# Patient Record
Sex: FEMALE | Race: ASIAN | Employment: OTHER | URBAN - METROPOLITAN AREA
[De-identification: names, ages, dates, MRNs, and addresses within clinical notes are randomized per-mention and may not be internally consistent; named-entity substitution may affect disease eponyms.]

---

## 2024-06-25 RX ORDER — HYDROCODONE BITARTRATE AND ACETAMINOPHEN 5; 325 MG/1; MG/1
1 TABLET ORAL EVERY 6 HOURS PRN
COMMUNITY
Start: 2024-05-28

## 2024-06-25 RX ORDER — GLIPIZIDE 10 MG/1
10 TABLET ORAL
COMMUNITY
Start: 2024-05-14

## 2024-06-25 RX ORDER — GABAPENTIN 300 MG/1
300 CAPSULE ORAL NIGHTLY
COMMUNITY
Start: 2024-05-14

## 2024-07-05 ENCOUNTER — LAB ENCOUNTER (OUTPATIENT)
Dept: LAB | Facility: HOSPITAL | Age: 74
End: 2024-07-05
Attending: FAMILY MEDICINE
Payer: MEDICARE

## 2024-07-05 ENCOUNTER — OFFICE VISIT (OUTPATIENT)
Dept: WOUND CARE | Facility: HOSPITAL | Age: 74
End: 2024-07-05
Attending: NURSE PRACTITIONER
Payer: MEDICARE

## 2024-07-05 VITALS
HEART RATE: 87 BPM | BODY MASS INDEX: 19.99 KG/M2 | RESPIRATION RATE: 18 BRPM | SYSTOLIC BLOOD PRESSURE: 124 MMHG | OXYGEN SATURATION: 97 % | HEIGHT: 65 IN | DIASTOLIC BLOOD PRESSURE: 74 MMHG | TEMPERATURE: 98 F | WEIGHT: 120 LBS

## 2024-07-05 DIAGNOSIS — Z01.818 PREOP TESTING: ICD-10-CM

## 2024-07-05 DIAGNOSIS — Z71.89 OSTOMY NURSE CONSULTATION: Primary | ICD-10-CM

## 2024-07-05 LAB
ANTIBODY SCREEN: NEGATIVE
BASOPHILS # BLD AUTO: 0.09 X10(3) UL (ref 0–0.2)
BASOPHILS NFR BLD AUTO: 0.6 %
DEPRECATED RDW RBC AUTO: 52.3 FL (ref 35.1–46.3)
EOSINOPHIL # BLD AUTO: 4.25 X10(3) UL (ref 0–0.7)
EOSINOPHIL NFR BLD AUTO: 27.2 %
ERYTHROCYTE [DISTWIDTH] IN BLOOD BY AUTOMATED COUNT: 17 % (ref 11–15)
HCT VFR BLD AUTO: 29.7 %
HGB BLD-MCNC: 9.2 G/DL
IMM GRANULOCYTES # BLD AUTO: 0.07 X10(3) UL (ref 0–1)
IMM GRANULOCYTES NFR BLD: 0.4 %
LYMPHOCYTES # BLD AUTO: 2.16 X10(3) UL (ref 1–4)
LYMPHOCYTES NFR BLD AUTO: 13.8 %
MCH RBC QN AUTO: 26.3 PG (ref 26–34)
MCHC RBC AUTO-ENTMCNC: 31 G/DL (ref 31–37)
MCV RBC AUTO: 84.9 FL
MONOCYTES # BLD AUTO: 0.62 X10(3) UL (ref 0.1–1)
MONOCYTES NFR BLD AUTO: 4 %
NEUTROPHILS # BLD AUTO: 8.45 X10 (3) UL (ref 1.5–7.7)
NEUTROPHILS # BLD AUTO: 8.45 X10(3) UL (ref 1.5–7.7)
NEUTROPHILS NFR BLD AUTO: 54 %
PLATELET # BLD AUTO: 354 10(3)UL (ref 150–450)
RBC # BLD AUTO: 3.5 X10(6)UL
RH BLOOD TYPE: POSITIVE
WBC # BLD AUTO: 15.6 X10(3) UL (ref 4–11)

## 2024-07-05 PROCEDURE — 86900 BLOOD TYPING SEROLOGIC ABO: CPT

## 2024-07-05 PROCEDURE — 36415 COLL VENOUS BLD VENIPUNCTURE: CPT

## 2024-07-05 PROCEDURE — 85025 COMPLETE CBC W/AUTO DIFF WBC: CPT

## 2024-07-05 PROCEDURE — 86901 BLOOD TYPING SEROLOGIC RH(D): CPT

## 2024-07-05 PROCEDURE — 85060 BLOOD SMEAR INTERPRETATION: CPT

## 2024-07-05 PROCEDURE — 86850 RBC ANTIBODY SCREEN: CPT

## 2024-07-05 PROCEDURE — 99213 OFFICE O/P EST LOW 20 MIN: CPT

## 2024-07-05 PROCEDURE — 86920 COMPATIBILITY TEST SPIN: CPT

## 2024-07-05 NOTE — PROGRESS NOTES
Patient seen today for ostomy marking. Plan for XI robotic assisted laparoscopic radical cystectomy, ileal conduit urinary diversion, bilateral pelvic lymph node dissection  surgery with Dr. Ibarra on 7/8/2024. Patient assessed in laying, sitting, and standing positions for optimal marking site. Stoma marking made in right lower within the patient's line of sight. Marking was made within rectus muscle avoiding belt line, away from umbilicus and creases/folds. Patient visualized stoma marking. Marking made with black pen and secured with Tegaderm. Extra Tegaderm given to patient. Discussed general ostomy care and expectations for post-op inpatient ostomy teaching; all questions answered to best of my ability.

## 2024-07-07 ENCOUNTER — ANESTHESIA EVENT (OUTPATIENT)
Dept: SURGERY | Facility: HOSPITAL | Age: 74
End: 2024-07-07
Payer: MEDICARE

## 2024-07-08 ENCOUNTER — HOSPITAL ENCOUNTER (INPATIENT)
Facility: HOSPITAL | Age: 74
LOS: 4 days | Discharge: HOME HEALTH | End: 2024-07-12
Attending: UROLOGY | Admitting: UROLOGY
Payer: MEDICARE

## 2024-07-08 ENCOUNTER — ANESTHESIA (OUTPATIENT)
Dept: SURGERY | Facility: HOSPITAL | Age: 74
End: 2024-07-08
Payer: MEDICARE

## 2024-07-08 ENCOUNTER — HOSPITAL ENCOUNTER (INPATIENT)
Facility: HOSPITAL | Age: 74
End: 2024-07-08
Attending: UROLOGY | Admitting: UROLOGY
Payer: MEDICARE

## 2024-07-08 DIAGNOSIS — C67.3 MALIGNANT NEOPLASM OF ANTERIOR WALL OF URINARY BLADDER (HCC): ICD-10-CM

## 2024-07-08 DIAGNOSIS — Z01.818 PREOP TESTING: Primary | ICD-10-CM

## 2024-07-08 PROBLEM — C67.9 BLADDER CANCER (HCC): Status: ACTIVE | Noted: 2024-07-08

## 2024-07-08 LAB
ANION GAP SERPL CALC-SCNC: 6 MMOL/L (ref 0–18)
BUN BLD-MCNC: 9 MG/DL (ref 9–23)
BUN/CREAT SERPL: 7.3 (ref 10–20)
CALCIUM BLD-MCNC: 8.6 MG/DL (ref 8.7–10.4)
CHLORIDE SERPL-SCNC: 109 MMOL/L (ref 98–112)
CO2 SERPL-SCNC: 18 MMOL/L (ref 21–32)
CREAT BLD-MCNC: 1.23 MG/DL
DEPRECATED RDW RBC AUTO: 52.3 FL (ref 35.1–46.3)
EGFRCR SERPLBLD CKD-EPI 2021: 46 ML/MIN/1.73M2 (ref 60–?)
ERYTHROCYTE [DISTWIDTH] IN BLOOD BY AUTOMATED COUNT: 16.8 % (ref 11–15)
GLUCOSE BLD-MCNC: 275 MG/DL (ref 70–99)
GLUCOSE BLDC GLUCOMTR-MCNC: 155 MG/DL (ref 70–99)
GLUCOSE BLDC GLUCOMTR-MCNC: 270 MG/DL (ref 70–99)
GLUCOSE BLDC GLUCOMTR-MCNC: 274 MG/DL (ref 70–99)
GLUCOSE BLDC GLUCOMTR-MCNC: 295 MG/DL (ref 70–99)
HCT VFR BLD AUTO: 28.2 %
HGB BLD-MCNC: 9.2 G/DL
MCH RBC QN AUTO: 27.5 PG (ref 26–34)
MCHC RBC AUTO-ENTMCNC: 32.6 G/DL (ref 31–37)
MCV RBC AUTO: 84.2 FL
OSMOLALITY SERPL CALC.SUM OF ELEC: 284 MOSM/KG (ref 275–295)
PLATELET # BLD AUTO: 284 10(3)UL (ref 150–450)
POTASSIUM SERPL-SCNC: 5.5 MMOL/L (ref 3.5–5.1)
RBC # BLD AUTO: 3.35 X10(6)UL
RH BLOOD TYPE: POSITIVE
SODIUM SERPL-SCNC: 133 MMOL/L (ref 136–145)
WBC # BLD AUTO: 11.8 X10(3) UL (ref 4–11)

## 2024-07-08 PROCEDURE — 0UT94ZZ RESECTION OF UTERUS, PERCUTANEOUS ENDOSCOPIC APPROACH: ICD-10-PCS | Performed by: UROLOGY

## 2024-07-08 PROCEDURE — 0TTB4ZZ RESECTION OF BLADDER, PERCUTANEOUS ENDOSCOPIC APPROACH: ICD-10-PCS | Performed by: UROLOGY

## 2024-07-08 PROCEDURE — 82962 GLUCOSE BLOOD TEST: CPT

## 2024-07-08 PROCEDURE — 0UT24ZZ RESECTION OF BILATERAL OVARIES, PERCUTANEOUS ENDOSCOPIC APPROACH: ICD-10-PCS | Performed by: UROLOGY

## 2024-07-08 PROCEDURE — 88342 IMHCHEM/IMCYTCHM 1ST ANTB: CPT | Performed by: UROLOGY

## 2024-07-08 PROCEDURE — 88364 INSITU HYBRIDIZATION (FISH): CPT | Performed by: UROLOGY

## 2024-07-08 PROCEDURE — 88341 IMHCHEM/IMCYTCHM EA ADD ANTB: CPT | Performed by: UROLOGY

## 2024-07-08 PROCEDURE — 85027 COMPLETE CBC AUTOMATED: CPT | Performed by: UROLOGY

## 2024-07-08 PROCEDURE — 88365 INSITU HYBRIDIZATION (FISH): CPT | Performed by: UROLOGY

## 2024-07-08 PROCEDURE — 0T184ZC BYPASS BILATERAL URETERS TO ILEOCUTANEOUS, PERCUTANEOUS ENDOSCOPIC APPROACH: ICD-10-PCS | Performed by: UROLOGY

## 2024-07-08 PROCEDURE — 0UT74ZZ RESECTION OF BILATERAL FALLOPIAN TUBES, PERCUTANEOUS ENDOSCOPIC APPROACH: ICD-10-PCS | Performed by: UROLOGY

## 2024-07-08 PROCEDURE — 88309 TISSUE EXAM BY PATHOLOGIST: CPT | Performed by: UROLOGY

## 2024-07-08 PROCEDURE — 88307 TISSUE EXAM BY PATHOLOGIST: CPT | Performed by: UROLOGY

## 2024-07-08 PROCEDURE — 80048 BASIC METABOLIC PNL TOTAL CA: CPT | Performed by: UROLOGY

## 2024-07-08 PROCEDURE — 07BC4ZZ EXCISION OF PELVIS LYMPHATIC, PERCUTANEOUS ENDOSCOPIC APPROACH: ICD-10-PCS | Performed by: UROLOGY

## 2024-07-08 PROCEDURE — 88305 TISSUE EXAM BY PATHOLOGIST: CPT | Performed by: UROLOGY

## 2024-07-08 PROCEDURE — 8E0W4CZ ROBOTIC ASSISTED PROCEDURE OF TRUNK REGION, PERCUTANEOUS ENDOSCOPIC APPROACH: ICD-10-PCS | Performed by: UROLOGY

## 2024-07-08 RX ORDER — DEXTROSE MONOHYDRATE 25 G/50ML
50 INJECTION, SOLUTION INTRAVENOUS
Status: DISCONTINUED | OUTPATIENT
Start: 2024-07-08 | End: 2024-07-08 | Stop reason: HOSPADM

## 2024-07-08 RX ORDER — ONDANSETRON 2 MG/ML
4 INJECTION INTRAMUSCULAR; INTRAVENOUS EVERY 6 HOURS PRN
Status: DISCONTINUED | OUTPATIENT
Start: 2024-07-08 | End: 2024-07-12

## 2024-07-08 RX ORDER — HEPARIN SODIUM 5000 [USP'U]/ML
5000 INJECTION, SOLUTION INTRAVENOUS; SUBCUTANEOUS ONCE
Status: COMPLETED | OUTPATIENT
Start: 2024-07-08 | End: 2024-07-08

## 2024-07-08 RX ORDER — PROCHLORPERAZINE EDISYLATE 5 MG/ML
5 INJECTION INTRAMUSCULAR; INTRAVENOUS ONCE
Status: COMPLETED | OUTPATIENT
Start: 2024-07-08 | End: 2024-07-08

## 2024-07-08 RX ORDER — GLYCOPYRROLATE 0.2 MG/ML
INJECTION, SOLUTION INTRAMUSCULAR; INTRAVENOUS AS NEEDED
Status: DISCONTINUED | OUTPATIENT
Start: 2024-07-08 | End: 2024-07-08 | Stop reason: SURG

## 2024-07-08 RX ORDER — SODIUM CHLORIDE, SODIUM LACTATE, POTASSIUM CHLORIDE, CALCIUM CHLORIDE 600; 310; 30; 20 MG/100ML; MG/100ML; MG/100ML; MG/100ML
INJECTION, SOLUTION INTRAVENOUS CONTINUOUS
Status: DISCONTINUED | OUTPATIENT
Start: 2024-07-08 | End: 2024-07-08 | Stop reason: ALTCHOICE

## 2024-07-08 RX ORDER — SODIUM CHLORIDE 9 MG/ML
INJECTION, SOLUTION INTRAVENOUS CONTINUOUS
Status: DISCONTINUED | OUTPATIENT
Start: 2024-07-08 | End: 2024-07-10

## 2024-07-08 RX ORDER — MORPHINE SULFATE 10 MG/ML
6 INJECTION, SOLUTION INTRAMUSCULAR; INTRAVENOUS EVERY 10 MIN PRN
Status: DISCONTINUED | OUTPATIENT
Start: 2024-07-08 | End: 2024-07-08 | Stop reason: HOSPADM

## 2024-07-08 RX ORDER — NEOSTIGMINE METHYLSULFATE 1 MG/ML
INJECTION, SOLUTION INTRAVENOUS AS NEEDED
Status: DISCONTINUED | OUTPATIENT
Start: 2024-07-08 | End: 2024-07-08 | Stop reason: SURG

## 2024-07-08 RX ORDER — HYDROMORPHONE HYDROCHLORIDE 1 MG/ML
0.2 INJECTION, SOLUTION INTRAMUSCULAR; INTRAVENOUS; SUBCUTANEOUS EVERY 2 HOUR PRN
Status: DISCONTINUED | OUTPATIENT
Start: 2024-07-08 | End: 2024-07-12

## 2024-07-08 RX ORDER — DOCUSATE SODIUM 100 MG/1
100 CAPSULE, LIQUID FILLED ORAL 2 TIMES DAILY
Status: DISCONTINUED | OUTPATIENT
Start: 2024-07-08 | End: 2024-07-12

## 2024-07-08 RX ORDER — ENOXAPARIN SODIUM 100 MG/ML
40 INJECTION SUBCUTANEOUS NIGHTLY
Status: DISCONTINUED | OUTPATIENT
Start: 2024-07-08 | End: 2024-07-12

## 2024-07-08 RX ORDER — ACETAMINOPHEN 500 MG
1000 TABLET ORAL EVERY 6 HOURS
Status: DISCONTINUED | OUTPATIENT
Start: 2024-07-08 | End: 2024-07-12

## 2024-07-08 RX ORDER — MORPHINE SULFATE 4 MG/ML
4 INJECTION, SOLUTION INTRAMUSCULAR; INTRAVENOUS EVERY 10 MIN PRN
Status: DISCONTINUED | OUTPATIENT
Start: 2024-07-08 | End: 2024-07-08 | Stop reason: HOSPADM

## 2024-07-08 RX ORDER — BUPIVACAINE HYDROCHLORIDE 2.5 MG/ML
INJECTION, SOLUTION EPIDURAL; INFILTRATION; INTRACAUDAL AS NEEDED
Status: DISCONTINUED | OUTPATIENT
Start: 2024-07-08 | End: 2024-07-08 | Stop reason: HOSPADM

## 2024-07-08 RX ORDER — MIDAZOLAM HYDROCHLORIDE 1 MG/ML
INJECTION INTRAMUSCULAR; INTRAVENOUS AS NEEDED
Status: DISCONTINUED | OUTPATIENT
Start: 2024-07-08 | End: 2024-07-08 | Stop reason: SURG

## 2024-07-08 RX ORDER — ONDANSETRON 2 MG/ML
4 INJECTION INTRAMUSCULAR; INTRAVENOUS ONCE
Status: COMPLETED | OUTPATIENT
Start: 2024-07-08 | End: 2024-07-08

## 2024-07-08 RX ORDER — FAMOTIDINE 20 MG/1
20 TABLET, FILM COATED ORAL NIGHTLY
Status: DISCONTINUED | OUTPATIENT
Start: 2024-07-08 | End: 2024-07-12

## 2024-07-08 RX ORDER — IBUPROFEN 400 MG/1
400 TABLET ORAL EVERY 6 HOURS
Status: DISCONTINUED | OUTPATIENT
Start: 2024-07-08 | End: 2024-07-12

## 2024-07-08 RX ORDER — HYDROMORPHONE HYDROCHLORIDE 1 MG/ML
0.4 INJECTION, SOLUTION INTRAMUSCULAR; INTRAVENOUS; SUBCUTANEOUS EVERY 2 HOUR PRN
Status: DISCONTINUED | OUTPATIENT
Start: 2024-07-08 | End: 2024-07-12

## 2024-07-08 RX ORDER — LIDOCAINE HYDROCHLORIDE ANHYDROUS AND DEXTROSE MONOHYDRATE .8; 5 G/100ML; G/100ML
INJECTION, SOLUTION INTRAVENOUS CONTINUOUS PRN
Status: DISCONTINUED | OUTPATIENT
Start: 2024-07-08 | End: 2024-07-08 | Stop reason: SURG

## 2024-07-08 RX ORDER — FAMOTIDINE 10 MG/ML
20 INJECTION, SOLUTION INTRAVENOUS NIGHTLY
Status: DISCONTINUED | OUTPATIENT
Start: 2024-07-08 | End: 2024-07-12

## 2024-07-08 RX ORDER — KETAMINE HYDROCHLORIDE 50 MG/ML
INJECTION, SOLUTION INTRAMUSCULAR; INTRAVENOUS AS NEEDED
Status: DISCONTINUED | OUTPATIENT
Start: 2024-07-08 | End: 2024-07-08 | Stop reason: SURG

## 2024-07-08 RX ORDER — BISACODYL 10 MG
10 SUPPOSITORY, RECTAL RECTAL
Status: DISCONTINUED | OUTPATIENT
Start: 2024-07-08 | End: 2024-07-12

## 2024-07-08 RX ORDER — SENNOSIDES 8.6 MG
17.2 TABLET ORAL NIGHTLY
Status: DISCONTINUED | OUTPATIENT
Start: 2024-07-08 | End: 2024-07-12

## 2024-07-08 RX ORDER — SODIUM CHLORIDE 9 MG/ML
INJECTION, SOLUTION INTRAVENOUS CONTINUOUS PRN
Status: DISCONTINUED | OUTPATIENT
Start: 2024-07-08 | End: 2024-07-08 | Stop reason: SURG

## 2024-07-08 RX ORDER — SODIUM CHLORIDE, SODIUM LACTATE, POTASSIUM CHLORIDE, CALCIUM CHLORIDE 600; 310; 30; 20 MG/100ML; MG/100ML; MG/100ML; MG/100ML
INJECTION, SOLUTION INTRAVENOUS CONTINUOUS
Status: DISCONTINUED | OUTPATIENT
Start: 2024-07-08 | End: 2024-07-08 | Stop reason: HOSPADM

## 2024-07-08 RX ORDER — METRONIDAZOLE 500 MG/100ML
500 INJECTION, SOLUTION INTRAVENOUS ONCE
Status: COMPLETED | OUTPATIENT
Start: 2024-07-08 | End: 2024-07-08

## 2024-07-08 RX ORDER — HYDROMORPHONE HYDROCHLORIDE 1 MG/ML
0.6 INJECTION, SOLUTION INTRAMUSCULAR; INTRAVENOUS; SUBCUTANEOUS EVERY 5 MIN PRN
Status: DISCONTINUED | OUTPATIENT
Start: 2024-07-08 | End: 2024-07-08 | Stop reason: HOSPADM

## 2024-07-08 RX ORDER — ROCURONIUM BROMIDE 10 MG/ML
INJECTION, SOLUTION INTRAVENOUS AS NEEDED
Status: DISCONTINUED | OUTPATIENT
Start: 2024-07-08 | End: 2024-07-08 | Stop reason: SURG

## 2024-07-08 RX ORDER — TRAMADOL HYDROCHLORIDE 50 MG/1
50 TABLET ORAL EVERY 6 HOURS PRN
Status: DISCONTINUED | OUTPATIENT
Start: 2024-07-08 | End: 2024-07-12

## 2024-07-08 RX ORDER — HYDROMORPHONE HYDROCHLORIDE 1 MG/ML
0.2 INJECTION, SOLUTION INTRAMUSCULAR; INTRAVENOUS; SUBCUTANEOUS EVERY 5 MIN PRN
Status: DISCONTINUED | OUTPATIENT
Start: 2024-07-08 | End: 2024-07-08 | Stop reason: HOSPADM

## 2024-07-08 RX ORDER — ONDANSETRON 2 MG/ML
INJECTION INTRAMUSCULAR; INTRAVENOUS AS NEEDED
Status: DISCONTINUED | OUTPATIENT
Start: 2024-07-08 | End: 2024-07-08 | Stop reason: SURG

## 2024-07-08 RX ORDER — NICOTINE POLACRILEX 4 MG
15 LOZENGE BUCCAL
Status: DISCONTINUED | OUTPATIENT
Start: 2024-07-08 | End: 2024-07-08 | Stop reason: HOSPADM

## 2024-07-08 RX ORDER — NALOXONE HYDROCHLORIDE 0.4 MG/ML
80 INJECTION, SOLUTION INTRAMUSCULAR; INTRAVENOUS; SUBCUTANEOUS AS NEEDED
Status: DISCONTINUED | OUTPATIENT
Start: 2024-07-08 | End: 2024-07-08 | Stop reason: HOSPADM

## 2024-07-08 RX ORDER — GABAPENTIN 300 MG/1
300 CAPSULE ORAL NIGHTLY
Status: DISCONTINUED | OUTPATIENT
Start: 2024-07-08 | End: 2024-07-12

## 2024-07-08 RX ORDER — INSULIN ASPART 100 [IU]/ML
5 INJECTION, SOLUTION INTRAVENOUS; SUBCUTANEOUS ONCE
Status: COMPLETED | OUTPATIENT
Start: 2024-07-08 | End: 2024-07-08

## 2024-07-08 RX ORDER — MORPHINE SULFATE 4 MG/ML
2 INJECTION, SOLUTION INTRAMUSCULAR; INTRAVENOUS EVERY 10 MIN PRN
Status: DISCONTINUED | OUTPATIENT
Start: 2024-07-08 | End: 2024-07-08 | Stop reason: HOSPADM

## 2024-07-08 RX ORDER — NICOTINE POLACRILEX 4 MG
30 LOZENGE BUCCAL
Status: DISCONTINUED | OUTPATIENT
Start: 2024-07-08 | End: 2024-07-08 | Stop reason: HOSPADM

## 2024-07-08 RX ORDER — ACETAMINOPHEN 500 MG
1000 TABLET ORAL ONCE
Status: DISCONTINUED | OUTPATIENT
Start: 2024-07-08 | End: 2024-07-08 | Stop reason: HOSPADM

## 2024-07-08 RX ORDER — DEXAMETHASONE SODIUM PHOSPHATE 4 MG/ML
VIAL (ML) INJECTION AS NEEDED
Status: DISCONTINUED | OUTPATIENT
Start: 2024-07-08 | End: 2024-07-08 | Stop reason: SURG

## 2024-07-08 RX ORDER — HYDRALAZINE HYDROCHLORIDE 20 MG/ML
5 INJECTION INTRAMUSCULAR; INTRAVENOUS AS NEEDED
Status: DISCONTINUED | OUTPATIENT
Start: 2024-07-08 | End: 2024-07-08 | Stop reason: HOSPADM

## 2024-07-08 RX ORDER — METRONIDAZOLE 500 MG/100ML
500 INJECTION, SOLUTION INTRAVENOUS ONCE
Status: DISCONTINUED | OUTPATIENT
Start: 2024-07-08 | End: 2024-07-08

## 2024-07-08 RX ORDER — METOCLOPRAMIDE HYDROCHLORIDE 5 MG/ML
5 INJECTION INTRAMUSCULAR; INTRAVENOUS EVERY 8 HOURS PRN
Status: DISCONTINUED | OUTPATIENT
Start: 2024-07-08 | End: 2024-07-12

## 2024-07-08 RX ORDER — HYDROMORPHONE HYDROCHLORIDE 1 MG/ML
0.4 INJECTION, SOLUTION INTRAMUSCULAR; INTRAVENOUS; SUBCUTANEOUS EVERY 5 MIN PRN
Status: DISCONTINUED | OUTPATIENT
Start: 2024-07-08 | End: 2024-07-08 | Stop reason: HOSPADM

## 2024-07-08 RX ORDER — LIDOCAINE HYDROCHLORIDE 10 MG/ML
INJECTION, SOLUTION EPIDURAL; INFILTRATION; INTRACAUDAL; PERINEURAL AS NEEDED
Status: DISCONTINUED | OUTPATIENT
Start: 2024-07-08 | End: 2024-07-08 | Stop reason: SURG

## 2024-07-08 RX ADMIN — METRONIDAZOLE 500 MG: 500 INJECTION, SOLUTION INTRAVENOUS at 08:12:00

## 2024-07-08 RX ADMIN — SODIUM CHLORIDE, SODIUM LACTATE, POTASSIUM CHLORIDE, CALCIUM CHLORIDE: 600; 310; 30; 20 INJECTION, SOLUTION INTRAVENOUS at 09:46:00

## 2024-07-08 RX ADMIN — SODIUM CHLORIDE, SODIUM LACTATE, POTASSIUM CHLORIDE, CALCIUM CHLORIDE: 600; 310; 30; 20 INJECTION, SOLUTION INTRAVENOUS at 08:21:00

## 2024-07-08 RX ADMIN — LIDOCAINE HYDROCHLORIDE 50 MG: 10 INJECTION, SOLUTION EPIDURAL; INFILTRATION; INTRACAUDAL; PERINEURAL at 07:49:00

## 2024-07-08 RX ADMIN — SODIUM CHLORIDE, SODIUM LACTATE, POTASSIUM CHLORIDE, CALCIUM CHLORIDE: 600; 310; 30; 20 INJECTION, SOLUTION INTRAVENOUS at 11:51:00

## 2024-07-08 RX ADMIN — SODIUM CHLORIDE: 9 INJECTION, SOLUTION INTRAVENOUS at 12:02:00

## 2024-07-08 RX ADMIN — LIDOCAINE HYDROCHLORIDE ANHYDROUS AND DEXTROSE MONOHYDRATE 2 MG/MIN: .8; 5 INJECTION, SOLUTION INTRAVENOUS at 08:26:00

## 2024-07-08 RX ADMIN — MIDAZOLAM HYDROCHLORIDE 1 MG: 1 INJECTION INTRAMUSCULAR; INTRAVENOUS at 07:47:00

## 2024-07-08 RX ADMIN — SODIUM CHLORIDE: 9 INJECTION, SOLUTION INTRAVENOUS at 08:01:00

## 2024-07-08 RX ADMIN — SODIUM CHLORIDE, SODIUM LACTATE, POTASSIUM CHLORIDE, CALCIUM CHLORIDE: 600; 310; 30; 20 INJECTION, SOLUTION INTRAVENOUS at 07:45:00

## 2024-07-08 RX ADMIN — ROCURONIUM BROMIDE 20 MG: 10 INJECTION, SOLUTION INTRAVENOUS at 08:39:00

## 2024-07-08 RX ADMIN — ROCURONIUM BROMIDE 20 MG: 10 INJECTION, SOLUTION INTRAVENOUS at 08:02:00

## 2024-07-08 RX ADMIN — LIDOCAINE HYDROCHLORIDE ANHYDROUS AND DEXTROSE MONOHYDRATE 2 MG/MIN: .8; 5 INJECTION, SOLUTION INTRAVENOUS at 12:16:00

## 2024-07-08 RX ADMIN — SODIUM CHLORIDE: 9 INJECTION, SOLUTION INTRAVENOUS at 13:10:00

## 2024-07-08 RX ADMIN — ROCURONIUM BROMIDE 10 MG: 10 INJECTION, SOLUTION INTRAVENOUS at 07:49:00

## 2024-07-08 RX ADMIN — SODIUM CHLORIDE: 9 INJECTION, SOLUTION INTRAVENOUS at 11:52:00

## 2024-07-08 RX ADMIN — MIDAZOLAM HYDROCHLORIDE 1 MG: 1 INJECTION INTRAMUSCULAR; INTRAVENOUS at 07:57:00

## 2024-07-08 RX ADMIN — ROCURONIUM BROMIDE 20 MG: 10 INJECTION, SOLUTION INTRAVENOUS at 11:47:00

## 2024-07-08 RX ADMIN — LIDOCAINE HYDROCHLORIDE ANHYDROUS AND DEXTROSE MONOHYDRATE 1 MG/MIN: .8; 5 INJECTION, SOLUTION INTRAVENOUS at 08:03:00

## 2024-07-08 RX ADMIN — GLYCOPYRROLATE 0.6 MG: 0.2 INJECTION, SOLUTION INTRAMUSCULAR; INTRAVENOUS at 13:26:00

## 2024-07-08 RX ADMIN — KETAMINE HYDROCHLORIDE 25 MG: 50 INJECTION, SOLUTION INTRAMUSCULAR; INTRAVENOUS at 08:33:00

## 2024-07-08 RX ADMIN — ONDANSETRON 4 MG: 2 INJECTION INTRAMUSCULAR; INTRAVENOUS at 13:12:00

## 2024-07-08 RX ADMIN — SODIUM CHLORIDE: 9 INJECTION, SOLUTION INTRAVENOUS at 09:46:00

## 2024-07-08 RX ADMIN — NEOSTIGMINE METHYLSULFATE 3 MG: 1 INJECTION, SOLUTION INTRAVENOUS at 13:26:00

## 2024-07-08 RX ADMIN — DEXAMETHASONE SODIUM PHOSPHATE 8 MG: 4 MG/ML VIAL (ML) INJECTION at 08:21:00

## 2024-07-08 RX ADMIN — SODIUM CHLORIDE, SODIUM LACTATE, POTASSIUM CHLORIDE, CALCIUM CHLORIDE: 600; 310; 30; 20 INJECTION, SOLUTION INTRAVENOUS at 12:31:00

## 2024-07-08 RX ADMIN — KETAMINE HYDROCHLORIDE 25 MG: 50 INJECTION, SOLUTION INTRAMUSCULAR; INTRAVENOUS at 09:46:00

## 2024-07-08 RX ADMIN — LIDOCAINE HYDROCHLORIDE ANHYDROUS AND DEXTROSE MONOHYDRATE 2 MG/MIN: .8; 5 INJECTION, SOLUTION INTRAVENOUS at 12:06:00

## 2024-07-08 RX ADMIN — ROCURONIUM BROMIDE 20 MG: 10 INJECTION, SOLUTION INTRAVENOUS at 08:46:00

## 2024-07-08 RX ADMIN — SODIUM CHLORIDE: 9 INJECTION, SOLUTION INTRAVENOUS at 08:20:00

## 2024-07-08 RX ADMIN — LIDOCAINE HYDROCHLORIDE ANHYDROUS AND DEXTROSE MONOHYDRATE 1 MG/MIN: .8; 5 INJECTION, SOLUTION INTRAVENOUS at 12:11:00

## 2024-07-08 RX ADMIN — LIDOCAINE HYDROCHLORIDE ANHYDROUS AND DEXTROSE MONOHYDRATE 3 MG/MIN: .8; 5 INJECTION, SOLUTION INTRAVENOUS at 09:18:00

## 2024-07-08 NOTE — ANESTHESIA PROCEDURE NOTES
Airway  Date/Time: 7/8/2024 7:57 AM  Airway not difficult    General Information and Staff    Patient location during procedure: OR  Resident/CRNA: Lelo Hays CRNA  Performed: CRNA   Performed by: Lelo Hays CRNA  Authorized by: Laverne Hernandez MD      Indications and Patient Condition  Indications for airway management: anesthesia  Spontaneous ventilation: present  Sedation level: deep  Preoxygenated: yes  Patient position: sniffing  MILS maintained throughout  Mask difficulty assessment: 0 - not attempted  No planned trial extubation    Final Airway Details  Final airway type: endotracheal airway      Successful airway: ETT  Cuffed: yes   Successful intubation technique: Video laryngoscopy  Endotracheal tube insertion site: oral  Blade: Cody  Blade size: #3  ETT size (mm): 7.5    Cormack-Lehane Classification: grade I - full view of glottis  Placement verified by: capnometry   Cuff volume (mL): 6  Measured from: lips  ETT to lips (cm): 22  Number of attempts at approach: 1

## 2024-07-08 NOTE — ANESTHESIA POSTPROCEDURE EVALUATION
Patient: Michael Ibarra    Procedure Summary       Date: 07/08/24 Room / Location: [facility] MAIN OR  / [facility] MAIN OR    Anesthesia Start: 0745 Anesthesia Stop:     Procedure: XI robotic assisted laparoscopic radical cystectomy, ileal conduit urinary diversion, bilateral pelvic lymph node dissection (Abdomen) Diagnosis: (Bladder cancer)    Surgeons: Lenin Ibarra MD Anesthesiologist: Laverne Hernandez MD    Anesthesia Type: general ASA Status: 2            Anesthesia Type: general    Vitals Value Taken Time   /101 07/08/24 1352   Temp 98.1 07/08/24 1353   Pulse 77 07/08/24 1353   Resp 23 07/08/24 1353   SpO2 98 % 07/08/24 1353   Vitals shown include unfiled device data.    EM AN Post Evaluation:   Patient Evaluated in PACU  Patient Participation: complete - patient participated  Level of Consciousness: awake and alert  Pain Score: 0  Pain Management: adequate  Airway Patency:patent  Dental exam unchanged from preop  Yes    Nausea/Vomiting: none  Cardiovascular Status: acceptable  Respiratory Status: acceptable  Postoperative Hydration acceptable      SAMIR DEWITT CRNA  7/8/2024 1:53 PM

## 2024-07-08 NOTE — OPERATIVE REPORT
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  We reviewed management with the patient and she elected to proceed with radical cystectomy ileal conduit urinary diversion.  Anesthesia: general  Complications: none  Specimen: bladder/uterus/bilateral tubes/ovaries, bilateral pelvic lymph nodes,   Drains: ureteral stents, TOM  Condition: stable  Estimated Blood Loss: 100  Operative Details: see dictated note  Disposition: to JUSTICE    Operative note: Patient was identified and taken to the OR. After induction of anesthesia and placement of airway, patient was repositioned into supine and prepped and draped in sterile fashion.  We placed a 16Fr do catheter into the bladder.  We made a 1cm incision above the umbilicus and placed a Veress needle into the abdomen.  We insufflated the abdomen to 15mmgHg and placed a 8mm trocar at this site. We inspected the abdomen and then placed 3, 8-mm robotic trocars, 12 mm trocar and 5mm trocar all under direct vision and with local anesthetic.  the pressure was lowered to 8mmHG.  The patient was placed into steep trendelenburg and we docked the robot to the patient.    We took down colonic and small bowel adhesions from the pelvis to expose the pelvis and a posterior peritoneal incision was made to mobilize the ovaries and uterus.  The round ligament and gonadal vein were ligated bilaterally.    We then then performed a right sided lymph node dissection.  The  borders of the dissection were the external and common iliac artery deep down to the obturator nerve and vessels.  Medially we dissected down to the internal illiac artery.  Weck clips were used to ligate the lymphatics and we placed the external iliac chain separately from the common iliac chain.  There were no enlarged lymph nodes or lymph node masses.  The packets were collected in endocatch bags and sent for permanent pathology.  This was repeated on the left side.  During each side of dissection we identified the right and left ureter and these were mobilized down the bladder.  A Weck clip was placed on the distal ureter at the level of the bladder and the ureters were transected.  A 4-0 vicryl stay was placed on each ureter.       The posterior and superior vascular pedicle to the bladder was ligated on both sides with a vessel sealer and weck clips.  This dissected was kept wide at the posterior margin.   The endopelvic fascia was opened bilaterally.  The vagina was opened at the apex and dissected laterally towards the urethra.    Hemostasis was very good at this time of the case.  We then mobilized the bladder off of the anterior abdominal wall.  The space of retzius was entered and the prostate was fully mobilized.  We placed an 0-vicryl suture to ligate the deep dorsal vein.  Following this, we transected the deep dorsal vein and the urethra.  We placed a clip on the do and transected the catheter.  The remainder of the specimen was mobilized and then collected into an endocatch bag.  We extracted the lymph nodes and specimens through vagina.  We irrigated the pelvis and placed a mini-lap into the pelvis.  Hemostasis was excellent.  We then sutured the vagina  closed with a 3-0 vlock suture in running fashion.         Attention was then turned to mobilizing the left ureter more proximally and then bringing it over the right pelvis under the sigmoid mesentery.  Following this, we identified the terminal  ileum and cecum.  The small bowel mesentery was adhesed and required some lysis of adhesions.  We moblized a segment of ileum appoximately 20cm from the TI to use as our conduit.  Stay sutures of 3-0 silk and 2-0 vicryl were used to louise out the distal and proximal conduit.  We used the robotic stapler, 45mm to harvest the proximal and distal extent of the conduit.  The small bowel anastomosis was then completed.  We bought together the small bowel in a side to side configuration and placed stay sutures.  Two small enterotomies were made to place the stapler through and the anastomosis was complete using 2 45mm loads. We then used another load to close the anastomosis/enterotomy line.  We then sutured the back of the staple line for reinforcement.   We then brought th proximal end of the conduit into the pelvis.  A proximal and distal opening was made and the 6.5 fr feeding tubes were brought down to the level of the left ureter.  We then Spatulated the ureter and completed the uretero-intestinal anastomosis using 4-0 vicryl in running fashion with 2 sutures.  A similar anastomosis was completed for the right side.  We then sutured the stents to the conduit using 4-0 vicryl suture.        We placed a pelvic drain reaching the deep pelvis and up to the ureterointestinal anastomosis. It was secured with a 2-0 nylon suture    Following this a stay suture was placed into the conduit for extraction to the stoma level. The robot was undocked from the patient.  The conduit easily reached the stoma site.  A circumferential incision around a robotic trocar site was made and the fascia opened slightly to allow the conduit through.  We delivered the conduit to the skin and matured to the stoma using 3-0 vicryl suture to make a everted stoma site in interrupted fashion.  The stents were resutured to the conduit.  The patient's would was irrigated and closed with 0-PDS in running fashion using multiple sutures.  The skin was  irrigated with antibiotic irrigation and closed with 4-0 monocryl in subcuticular fashion.  Dressing were applied and he was awoken from anesthesia and taken to pacu in stable condition.  She tolerated the procedure well.      Lenin Ibarra

## 2024-07-08 NOTE — H&P
Reviewed from Dr. Severson, 6-28-24 and Nadja Ibarra 6-26-24    The above referenced H&P was reviewed by Lenin Ibarra MD on 7/8/2024, the patient was examined and no significant changes have occurred in the patient's condition since the H&P was performed.  Risks and benefits were discussed, proceed with procedure as planned.

## 2024-07-08 NOTE — ANESTHESIA PREPROCEDURE EVALUATION
Anesthesia PreOp Note    HPI:     Michael Ibarra is a 74 year old female who presents for preoperative consultation requested by: Lenin Ibarra MD    Date of Surgery: 7/8/2024    Procedure(s):  XI robotic assisted laparoscopic radical cystectomy, ileal conduit urinary diversion, bilateral pelvic lymph node dissection  Indication: Bladder cancer    Relevant Problems   No relevant active problems       NPO:  Last Liquid Consumption Date: 07/08/24  Last Liquid Consumption Time: 0200  Last Solid Consumption Date: 07/07/24  Last Solid Consumption Time: 1130  Last Liquid Consumption Date: 07/08/24          History Review:  Patient Active Problem List    Diagnosis Date Noted    Ostomy nurse consultation 07/05/2024       Past Medical History:    Back problem    Cancer (HCC)    bladder    Cataract    Diabetes (HCC)    Osteoarthritis       Past Surgical History:   Procedure Laterality Date    Cataract Bilateral        Medications Prior to Admission   Medication Sig Dispense Refill Last Dose    metFORMIN 850 MG Oral Tab Take 1 tablet (850 mg total) by mouth in the morning, at noon, and at bedtime.   7/7/2024 at 0800    SITagliptin Phosphate (JANUVIA) 100 MG Oral Tab Take 1 tablet (100 mg total) by mouth daily.   7/7/2024 at 0800    glipiZIDE 10 MG Oral Tab Take 1 tablet (10 mg total) by mouth 2 (two) times daily before meals.   7/7/2024 at 0800    HYDROcodone-acetaminophen 5-325 MG Oral Tab Take 1 tablet by mouth every 6 (six) hours as needed.   7/7/2024 at 0800    gabapentin 300 MG Oral Cap Take 1 capsule (300 mg total) by mouth nightly.   7/6/2024     Current Facility-Administered Medications Ordered in Epic   Medication Dose Route Frequency Provider Last Rate Last Admin    lactated ringers infusion   Intravenous Continuous Lenin Ibarra MD        acetaminophen (Tylenol Extra Strength) tab 1,000 mg  1,000 mg Oral Once Lenin Ibarra MD        metRONIDAZOLE in sodium chloride 0.79% (Flagyl) 5 mg/mL IVPB premix 500  mg  500 mg Intravenous Once Lenin Ibarra MD        ceFAZolin (Ancef) 2g in 10mL IV syringe premix  2 g Intravenous Once Lenin Ibarra MD        heparin (Porcine) 5000 UNIT/ML injection 5,000 Units  5,000 Units Subcutaneous Once Lenin Ibarra MD         No current [facility]-ordered outpatient medications on file.       No Known Allergies    History reviewed. No pertinent family history.  Social History     Socioeconomic History    Marital status:    Tobacco Use    Smoking status: Never    Smokeless tobacco: Never   Vaping Use    Vaping status: Never Used   Substance and Sexual Activity    Alcohol use: Never    Drug use: Never       Available pre-op labs reviewed.  Lab Results   Component Value Date    WBC 15.6 (H) 07/05/2024    RBC 3.50 (L) 07/05/2024    HGB 9.2 (L) 07/05/2024    HCT 29.7 (L) 07/05/2024    MCV 84.9 07/05/2024    MCH 26.3 07/05/2024    MCHC 31.0 07/05/2024    RDW 17.0 (H) 07/05/2024    .0 07/05/2024             Vital Signs:  Body mass index is 20.98 kg/m².   height is 1.676 m (5' 6\") and weight is 59 kg (130 lb).   Vitals:    06/25/24 1207   Weight: 59 kg (130 lb)   Height: 1.676 m (5' 6\")        Anesthesia Evaluation     Patient summary reviewed and Nursing notes reviewed    Airway   Mallampati: II  TM distance: >3 FB  Neck ROM: full  Dental          Pulmonary - normal exam   Cardiovascular - normal exam  Exercise tolerance: good    NYHA Classification: I  ECG reviewed  ROS comment: DR Momin cleared pt on her visit / copy is in media files    Neuro/Psych      GI/Hepatic/Renal      Endo/Other    (+) diabetes mellitus type 2 well controlled, arthritis    Comments: 07/05/24 12:32  WBC: 15.6 (H)  Hemoglobin: 9.2 (L)  Hematocrit: 29.7 (L)  Platelet Count: 354.0  RBC: 3.50 (L)  MCH: 26.3  MCHC: 31.0  MCV: 84.9  RDW: 17.0 (H)  RDW-SD: 52.3 (H)  Prelim Neutrophil Abs: 8.45 (H)  Neutrophils Absolute: 8.45 (H)  Lymphocytes Absolute: 2.16  Monocytes Absolute: 0.62  Eosinophils Absolute: 4.25  (H)  Basophils Absolute: 0.09  Immature Granulocyte Absolute: 0.07  Neutrophils %: 54.0  Lymphocytes %: 13.8  Monocytes %: 4.0  Eosinophils %: 27.2  Basophils %: 0.6  Immature Granulocyte %: 0.4  SLIDE REVIEW: See MD blood smear consultation.  MD Blood Smear Consult: Evaluation of the CBC with differential data and the peripheral blood smear demonstrates leukocytosis consisting of neutrophilia and eosinophilia, and moderate normocytic anemia.  Neutrophils and eosinophils demonstrate unremarkable morphology.  Red blood cells demonstrate mild anisopoikilocytosis, with macrocytes and microcytes.  There are no significant morphologic abnormalities in the other white blood cell subsets or platelets.    General differential considerations for neutrophilia include infection, drugs/hormones, tissue necrosis, inflammatory disorders, acute hemorrhage/hemolysis, and metabolic disorders.      Differential considerations for eosinophilia include allergic and hypersensitivity reactions, drug allergies, collagen vascular/connective tissue diseases, and some neoplastic conditions.      Differential causes for an anemia of this type may include chronic inflammatory disorders, chronic infections, neoplasms, hemolysis/hemorrhage, and end organ failure.     Clinical correlation is recommended.     Reviewed by Sugar Morales M.D.        (H): Data is abnormally high  (L): Data is abnormally low  Abdominal  - normal exam                 Anesthesia Plan:   ASA:  2  Plan:   General  Monitors and Lines:   Additonal IV  Airway:  ETT  Plan Comments: Clear site   Informed Consent Plan and Risks Discussed With:  Patient, child/children and spouse  Provider Attestation (if preop done by other):  GA/ETT/PONV,dental damages etc  Explained high possibility of blood transfusion       I have informed Mainkurt Dimaradhaaudrey Ibarra and/or legal guardian or family member of the nature of the anesthetic plan, benefits, risks including possible dental  damage if relevant, major complications, and any alternative forms of anesthetic management.   All of the patient's questions were answered to the best of my ability. The patient desires the anesthetic management as planned.  SHAWNA REYES MD  7/8/2024 6:14 AM  Present on Admission:  **None**

## 2024-07-08 NOTE — H&P
[facility] Hospitalist H&P       CC: No chief complaint on file.       PCP: SOURAV FARR    History of Present Illness: Patient is a 74 year old female with PMH sig for DM, and bladder CA who presented to the hospital for radical cystectomy, ileal conduit urinary diversion. Patient was seen and examined post operatively in PACU. She was still extremely groggy from anesthesia and was only starting to wake up. Daughter at bedside. Vitals currently stable.     PMH  Past Medical History:    Back problem    Cancer (HCC)    bladder    Cataract    Diabetes (HCC)    Osteoarthritis        PSH  Past Surgical History:   Procedure Laterality Date    Cataract Bilateral         ALL:  No Known Allergies     Home Medications:  Outpatient Medications Marked as Taking for the 7/8/24 encounter (Hospital Encounter)   Medication Sig Dispense Refill    metFORMIN 850 MG Oral Tab Take 1 tablet (850 mg total) by mouth in the morning, at noon, and at bedtime.      SITagliptin Phosphate (JANUVIA) 100 MG Oral Tab Take 1 tablet (100 mg total) by mouth daily.      glipiZIDE 10 MG Oral Tab Take 1 tablet (10 mg total) by mouth 2 (two) times daily before meals.      HYDROcodone-acetaminophen 5-325 MG Oral Tab Take 1 tablet by mouth every 6 (six) hours as needed.      gabapentin 300 MG Oral Cap Take 1 capsule (300 mg total) by mouth nightly.           Soc Hx  Social History     Tobacco Use    Smoking status: Never    Smokeless tobacco: Never   Substance Use Topics    Alcohol use: Never        Fam Hx  History reviewed. No pertinent family history.    Review of Systems  Comprehensive ROS reviewed and negative except for what's stated above.     OBJECTIVE:  BP (!) 174/95 (BP Location: Left arm)   Pulse 76   Temp 98.1 °F (36.7 °C) (Temporal)   Resp 13   Ht 5' 5\" (1.651 m)   Wt 123 lb (55.8 kg)   SpO2 97%   BMI 20.47 kg/m²   General:  groggy   Head:  Normocephalic               Neck: Supple   Lungs:   Clear to auscultation  bilaterally. Normal effort       Heart:  Regular rate and rhythm, S1, S2 normal,    Abdomen:   Soft, L drain and R ileostomy   Extremities: Extremities normal, atraumatic             Diagnostic Data:    CBC/Chem  Recent Labs   Lab 07/05/24  1232   WBC 15.6*   HGB 9.2*   MCV 84.9   .0       No results for input(s): \"NA\", \"K\", \"CL\", \"CO2\", \"BUN\", \"CREATSERUM\", \"GLU\", \"CA\", \"CAION\", \"MG\", \"PHOS\" in the last 168 hours.    No results for input(s): \"ALT\", \"AST\", \"ALB\", \"AMYLASE\", \"LIPASE\", \"LDH\" in the last 168 hours.    Invalid input(s): \"ALPHOS\", \"TBIL\", \"DBIL\", \"TPROT\"    No results for input(s): \"TROP\" in the last 168 hours.    Radiology: No results found.      ASSESSMENT / PLAN:   Patient is a 74 year old female with PMH sig for DM, and bladder CA who presented to the hospital for radical cystectomy, ileal conduit urinary diversion.     Bladder CA  - s/p lap radical cystectomy, ileal conduit urinary diversion POD # 0   - prn pain medication   - monitor respiratory status  - encouraged IS use  - prn anti emetics  - CBC in the morning  - dvt ppx: lovenox  - rest of management per urology    DM  - holding glipizide, sitagliptin, and metformin, resume on dc  - ISS, accuchek    FN:  - IVF: in pacu  - Diet: npo    DVT Prophy: lovenox   Lines: PIV    Dispo: pending clinical course    Outpatient records or previous hospital records reviewed.     Further recommendations pending patient's clinical course.  [facility] hospitalist to continue to follow patient while in house    Patient and/or patient's family given opportunity to ask questions and note understanding and agreeing with therapeutic plan as outlined    Thank You,  Jo-Ann Cottrell DO    Hospitalist with [facility]  Answering Service number: [phone]

## 2024-07-09 LAB
ALBUMIN SERPL-MCNC: 3.1 G/DL (ref 3.2–4.8)
ALBUMIN/GLOB SERPL: 1.1 {RATIO} (ref 1–2)
ALP LIVER SERPL-CCNC: 63 U/L
ALT SERPL-CCNC: <7 U/L
ANION GAP SERPL CALC-SCNC: 5 MMOL/L (ref 0–18)
ANION GAP SERPL CALC-SCNC: 5 MMOL/L (ref 0–18)
ANTIBODY SCREEN: NEGATIVE
AST SERPL-CCNC: 9 U/L (ref ?–34)
BASOPHILS # BLD AUTO: 0.02 X10(3) UL (ref 0–0.2)
BASOPHILS NFR BLD AUTO: 0.3 %
BILIRUB SERPL-MCNC: 0.2 MG/DL (ref 0.2–1.1)
BLOOD TYPE BARCODE: 7300
BUN BLD-MCNC: 11 MG/DL (ref 9–23)
BUN BLD-MCNC: 11 MG/DL (ref 9–23)
BUN/CREAT SERPL: 11 (ref 10–20)
BUN/CREAT SERPL: 11 (ref 10–20)
CALCIUM BLD-MCNC: 7.7 MG/DL (ref 8.7–10.4)
CALCIUM BLD-MCNC: 7.7 MG/DL (ref 8.7–10.4)
CHLORIDE SERPL-SCNC: 110 MMOL/L (ref 98–112)
CHLORIDE SERPL-SCNC: 110 MMOL/L (ref 98–112)
CO2 SERPL-SCNC: 22 MMOL/L (ref 21–32)
CO2 SERPL-SCNC: 22 MMOL/L (ref 21–32)
CREAT BLD-MCNC: 1 MG/DL
CREAT BLD-MCNC: 1 MG/DL
DEPRECATED RDW RBC AUTO: 52.3 FL (ref 35.1–46.3)
EGFRCR SERPLBLD CKD-EPI 2021: 58 ML/MIN/1.73M2 (ref 60–?)
EGFRCR SERPLBLD CKD-EPI 2021: 58 ML/MIN/1.73M2 (ref 60–?)
EOSINOPHIL # BLD AUTO: 0.12 X10(3) UL (ref 0–0.7)
EOSINOPHIL NFR BLD AUTO: 1.6 %
ERYTHROCYTE [DISTWIDTH] IN BLOOD BY AUTOMATED COUNT: 16.7 % (ref 11–15)
EST. AVERAGE GLUCOSE BLD GHB EST-MCNC: 186 MG/DL (ref 68–126)
GLOBULIN PLAS-MCNC: 2.9 G/DL (ref 2–3.5)
GLUCOSE BLD-MCNC: 175 MG/DL (ref 70–99)
GLUCOSE BLD-MCNC: 175 MG/DL (ref 70–99)
GLUCOSE BLDC GLUCOMTR-MCNC: 139 MG/DL (ref 70–99)
GLUCOSE BLDC GLUCOMTR-MCNC: 158 MG/DL (ref 70–99)
GLUCOSE BLDC GLUCOMTR-MCNC: 160 MG/DL (ref 70–99)
GLUCOSE BLDC GLUCOMTR-MCNC: 186 MG/DL (ref 70–99)
HBA1C MFR BLD: 8.1 % (ref ?–5.7)
HCT VFR BLD AUTO: 22.9 %
HGB BLD-MCNC: 7 G/DL
IMM GRANULOCYTES # BLD AUTO: 0.03 X10(3) UL (ref 0–1)
IMM GRANULOCYTES NFR BLD: 0.4 %
LYMPHOCYTES # BLD AUTO: 1.31 X10(3) UL (ref 1–4)
LYMPHOCYTES NFR BLD AUTO: 17.9 %
MCH RBC QN AUTO: 26.2 PG (ref 26–34)
MCHC RBC AUTO-ENTMCNC: 30.6 G/DL (ref 31–37)
MCV RBC AUTO: 85.8 FL
MONOCYTES # BLD AUTO: 0.46 X10(3) UL (ref 0.1–1)
MONOCYTES NFR BLD AUTO: 6.3 %
NEUTROPHILS # BLD AUTO: 5.36 X10 (3) UL (ref 1.5–7.7)
NEUTROPHILS # BLD AUTO: 5.36 X10(3) UL (ref 1.5–7.7)
NEUTROPHILS NFR BLD AUTO: 73.5 %
OSMOLALITY SERPL CALC.SUM OF ELEC: 288 MOSM/KG (ref 275–295)
OSMOLALITY SERPL CALC.SUM OF ELEC: 288 MOSM/KG (ref 275–295)
PLATELET # BLD AUTO: 264 10(3)UL (ref 150–450)
POTASSIUM SERPL-SCNC: 5 MMOL/L (ref 3.5–5.1)
POTASSIUM SERPL-SCNC: 5 MMOL/L (ref 3.5–5.1)
PROT SERPL-MCNC: 6 G/DL (ref 5.7–8.2)
RBC # BLD AUTO: 2.67 X10(6)UL
RH BLOOD TYPE: POSITIVE
SODIUM SERPL-SCNC: 137 MMOL/L (ref 136–145)
SODIUM SERPL-SCNC: 137 MMOL/L (ref 136–145)
UNIT VOLUME: 350 ML
WBC # BLD AUTO: 7.3 X10(3) UL (ref 4–11)

## 2024-07-09 PROCEDURE — 86920 COMPATIBILITY TEST SPIN: CPT

## 2024-07-09 PROCEDURE — 36430 TRANSFUSION BLD/BLD COMPNT: CPT

## 2024-07-09 PROCEDURE — 85060 BLOOD SMEAR INTERPRETATION: CPT | Performed by: UROLOGY

## 2024-07-09 PROCEDURE — 83036 HEMOGLOBIN GLYCOSYLATED A1C: CPT | Performed by: STUDENT IN AN ORGANIZED HEALTH CARE EDUCATION/TRAINING PROGRAM

## 2024-07-09 PROCEDURE — 30233N1 TRANSFUSION OF NONAUTOLOGOUS RED BLOOD CELLS INTO PERIPHERAL VEIN, PERCUTANEOUS APPROACH: ICD-10-PCS | Performed by: UROLOGY

## 2024-07-09 PROCEDURE — 85025 COMPLETE CBC W/AUTO DIFF WBC: CPT | Performed by: UROLOGY

## 2024-07-09 PROCEDURE — 86850 RBC ANTIBODY SCREEN: CPT | Performed by: STUDENT IN AN ORGANIZED HEALTH CARE EDUCATION/TRAINING PROGRAM

## 2024-07-09 PROCEDURE — 82962 GLUCOSE BLOOD TEST: CPT

## 2024-07-09 PROCEDURE — 99215 OFFICE O/P EST HI 40 MIN: CPT

## 2024-07-09 PROCEDURE — 86901 BLOOD TYPING SEROLOGIC RH(D): CPT | Performed by: STUDENT IN AN ORGANIZED HEALTH CARE EDUCATION/TRAINING PROGRAM

## 2024-07-09 PROCEDURE — 80053 COMPREHEN METABOLIC PANEL: CPT | Performed by: UROLOGY

## 2024-07-09 PROCEDURE — 86900 BLOOD TYPING SEROLOGIC ABO: CPT | Performed by: STUDENT IN AN ORGANIZED HEALTH CARE EDUCATION/TRAINING PROGRAM

## 2024-07-09 RX ORDER — SODIUM CHLORIDE 9 MG/ML
INJECTION, SOLUTION INTRAVENOUS ONCE
Status: COMPLETED | OUTPATIENT
Start: 2024-07-09 | End: 2024-07-09

## 2024-07-09 NOTE — CM/SW NOTE
07/09/24 1500   CM/SW Screening   Referral Source Physician   Information Source [facility] staff;Chart review;Nursing rounds   Patient's Current Mental Status at Time of Assessment Alert;Oriented   Patient's Home Environment House   Patient lives with Daughter   Discharge Needs   Anticipated D/C needs Home health care       CM met with pt/DIL/son at bedside. Confirmed address on file and PCP.    DIL declined , she will translate for pt.    CM discussed dc plan for HHC, DIL is agreeable and requested RN PT OT.  CM provided list and DIL is agreeable to [facility]. Reserved in aidin, f2f adjusted to include above disciplines.     Dtr concerned pt might need inpt rehab. CM reassured her that by the time of dc pt will be better and will likely not meet criteria for inpt rehab. Discussed limited benefits for [facility] with her current Medicare B only and Medicaid. Tent ref will be sent as a back up plan if needed. CM req [facility] to send tent [facility] ref, will need PASRR and CLRC to proceed.    CM asked RN to order PT/OT evals for tomorrow.    7/10 0955  PT anticipates no dc needs    CM canceled the pending [facility] ref, pt will not meet criteria.    Notified pt /fam of plan for HHC at dc.    Plan  Home with [facility]    / to remain available for support and/or discharge planning.     Nancy Hernandez, RN    Ext [phone]

## 2024-07-09 NOTE — PROGRESS NOTES
07/09/24 1100   Wound 07/08/24 Abdomen Anterior   Date First Assessed/Time First Assessed: 07/08/24 0834   Primary Wound Type: (c) Incision  Location: Abdomen  Wound Location Orientation: Anterior   Closure Skin glue   Drainage Amount None   Dressing Status Dry;Intact;Old drainage   Whitney-wound Assessment Dry;Intact   Urostomy Ileal conduit RLQ   Placement Date: 07/08/24   Inserted by: Dr Ibarra  Urostomy Type: Ileal conduit  Location: RLQ  Stoma Size (cm): 1.25 cm  Appliance Size: 2 1/4   Stomal Appliance 2 piece;Intact  (changed for teaching)  WOUND CARE NOTE    History:  Past Medical History:    Back problem    Cancer (HCC)    bladder    Cataract    Diabetes (HCC)    Osteoarthritis     Past Surgical History:   Procedure Laterality Date    Cataract Bilateral       Social History     Socioeconomic History    Marital status:    Tobacco Use    Smoking status: Never    Smokeless tobacco: Never   Vaping Use    Vaping status: Never Used   Substance and Sexual Activity    Alcohol use: Never    Drug use: Never     Social Determinants of Health     Food Insecurity: No Food Insecurity (7/8/2024)    Food Insecurity     Food Insecurity: Never true   Transportation Needs: No Transportation Needs (7/8/2024)    Transportation Needs     Lack of Transportation: No   Housing Stability: Low Risk  (7/8/2024)    Housing Stability     Housing Instability: No         PLAN   Recommendations:  Urology is following the pt.   Dietary consult for recommendations for nutrition to optimize wound healing  To prevent sliding: decrease head of bed and elevate foot of bed as medical condition tolerates  Glucose control to help promote wound healing    Wound(s)  Location: Abdomen  Per MD     Ostomy:  Change flange every 5-7 days and prn  Appliance 2 1/4 inch 2 pc urostomy appliance  Teaching: Ostomy folder given  Patient & daughter and son in law: started to teach ostomy care. Response to teaching: good    Discharge Recommendations: The pt.  will benefit from a  care nurse after discharge to continue to reinforce the ileal conduit teaching    OBJECTIVE   MD Consult new ileal conduit care and teaching      ASSESSMENT   Vargas Score:  Vargas Scale Score: 19    Chart Reviewed: yes  The  pt. is s/p new ileal conduit by Dr. Ibarra 7/9/24. The pt. is sitting up in bed, tolerating clear liquids. Her daughter and son in law are at the bedside. They translate to the pt. in Gujarati. They watched the care of emptying the pouch, connecting to a do and leg bag and appliance change. The pt. was given a urostomy information folder, reviewed the folder with them and all questions answered. The plan is for the pt. to change her appliance by Friday. Dr. Mendosa was into see the pt. Spoke with the nurse. They are agreeable for the [employer] starter kit with the me+ program.       Ostomy:  Stoma location: RLQ  Stoma type: ileal conduit  Stoma color: red, moist, 2 stents intact  Stoma condition: edematous  Stoma diameter: 1.25 inches  Ostomy output: urine  Stoma height: budding  Peristomal skin: intact, close to lap sites  Pouching system:two piece urostomy 2 1/4 inch  Able to care for stoma: Yes but needs further education and practice     Allergies: Patient has no known allergies.    Labs:   Lab Results   Component Value Date    WBC 7.3 07/09/2024    HGB 7.0 (L) 07/09/2024    HCT 22.9 (L) 07/09/2024    .0 07/09/2024    CREATSERUM 1.00 07/09/2024    CREATSERUM 1.00 07/09/2024    BUN 11 07/09/2024    BUN 11 07/09/2024     07/09/2024     07/09/2024    K 5.0 07/09/2024    K 5.0 07/09/2024     07/09/2024     07/09/2024    CO2 22.0 07/09/2024    CO2 22.0 07/09/2024     (H) 07/09/2024     (H) 07/09/2024    CA 7.7 (L) 07/09/2024    CA 7.7 (L) 07/09/2024    ALB 3.1 (L) 07/09/2024    ALKPHO 63 07/09/2024    BILT 0.2 07/09/2024    TP 6.0 07/09/2024    AST 9 07/09/2024    ALT <7 (L) 07/09/2024     No results found for:  \"PREALBUMIN\"      Time Spent 1 Hour 30 Minutes.    Lexi Cheng RN  [facility] IP Wound Care  [facility]  [phone]     Stomal Assessment Moist;Pink;Red;Edema;Budding  (1.25 inch stoma, 2 stents in place)   Peristomal Assessment Intact;Pink  (close to skin glue & incisions)   Treatment Appliance change;Bag change;Site care  (skin prep, 2 1/4 inc urostomy appliance)   Dressing Change Due 07/12/24   Output (mL) 100 mL  (clear yellow)   Wound Follow Up   Follow up needed Yes

## 2024-07-09 NOTE — PROGRESS NOTES
[facility]  Urology Progress Note    Michael Ibarra Patient Status:  Inpatient    1950 MRN [id number]   Location [facility] 4W/SW/SE Attending Lenin Ibarra MD   Hosp Day # 1 PCP SOURAV FRAR     Subjective:  Michael Ibarra is a(n) 74 year old female.    Hospital course to date: POD #1 robotic radical cystectomy, anterior exenteration, bilateral pelvic lymph node dissection, and ileal conduit    Current complaints: The patient feels okay today. She is tolerating clears and her pain is controlled. She has not passed flatus.     Objective:  General appearance: alert, cooperative, and no distress  Blood pressure 109/64, pulse 65, temperature 97.8 °F (36.6 °C), temperature source Oral, resp. rate 16, height 5' 5\" (1.651 m), weight 123 lb (55.8 kg), SpO2 95%.  Lungs: Respirations non labored.  Abdomen: Soft.  Incisions: Dressings clean and dry.  : Conduit pink, draining clear yellow urine. Stents in place.    Lab Results   Component Value Date    WBC 7.3 2024    HGB 7.0 2024    HCT 22.9 2024    .0 2024    CREATSERUM 1.00 2024    CREATSERUM 1.00 2024    BUN 11 2024    BUN 11 2024     2024     2024    K 5.0 2024    K 5.0 2024     2024     2024    CO2 22.0 2024    CO2 22.0 2024     2024     2024    CA 7.7 2024    CA 7.7 2024    ALB 3.1 2024    ALKPHO 63 2024    BILT 0.2 2024    TP 6.0 2024    AST 9 2024    ALT <7 2024    PGLU 158 2024       Assessment:  Bladder cancer s/p robotic radical cystectomy, anterior exenteration, bilateral pelvic lymph node dissection, and ileal conduit.    Plan:  She is doing well. Continue clears and increase activity today. She will be transfused as well.     Andrzej Mendosa MD  2024  12:05 PM

## 2024-07-09 NOTE — CM/SW NOTE
Department  notified of request for HHC, aidin referrals started. Assigned CM/SW to follow up with pt/family on further discharge planning.     Sarika Bernal  DSC

## 2024-07-09 NOTE — CM/SW NOTE
MDO for HHC -new ileal conduit     CM req DSC to send C ref. CM will f/u with pt to confirm once list is available.    F2f done for RN    Plan  Home w/ family and C pending choice    / to remain available for support and/or discharge planning.     Nancy Hernandez, RN    Ext [phone]

## 2024-07-09 NOTE — PROGRESS NOTES
[facility] Hospitalist Progress Note     CC: Hospital Follow up    PCP: SOURAV FARR       Assessment/Plan:     Active Problems:    Bladder cancer (HCC)  Patient is a 74 year old female with PMH sig for DM, and bladder CA who presented to the hospital for radical cystectomy, ileal conduit urinary diversion.      Bladder CA  - s/p lap radical cystectomy, ileal conduit urinary diversion POD # 1  - prn pain medication   - monitor respiratory status  - encouraged IS use  - prn anti emetics  - dvt ppx: lovenox  - rest of management per urology    Acute on chronic anemia  - hgb of 7 this morning, was 9.2 prior  - also with borderline low BP, will transfuse 1 unit of pRBC, no need for post transfusion CBC  - CBC tomorrow morning     DM  - holding glipizide, sitagliptin, and metformin, resume on dc  - ISS, accuchek     FN:  - IVF:none  - Diet: CLD     DVT Prophy: lovenox    Lines: PIV    Thank You,  Jo-Ann Cottrell, DO    Hospitalist with [facility]     Subjective:     Feels well. Tolerating clears. Daughter at bedside. Pain tolerable. No cp or sob    OBJECTIVE:    Blood pressure 109/64, pulse 65, temperature 97.8 °F (36.6 °C), temperature source Oral, resp. rate 16, height 5' 5\" (1.651 m), weight 123 lb (55.8 kg), SpO2 95%.    Temp:  [97.5 °F (36.4 °C)-98.2 °F (36.8 °C)] 97.8 °F (36.6 °C)  Pulse:  [59-97] 65  Resp:  [12-26] 16  BP: ()/(56-99) 109/64  SpO2:  [94 %-100 %] 95 %      Intake/Output:    Intake/Output Summary (Last 24 hours) at 7/9/2024 1203  Last data filed at 7/9/2024 1010  Gross per 24 hour   Intake 1780 ml   Output 3085 ml   Net -1305 ml       Last 3 Weights   07/08/24 0626 123 lb (55.8 kg)   06/25/24 1207 130 lb (59 kg)   07/05/24 1304 120 lb (54.4 kg)       Exam   Gen: No acute distress, alert and oriented x3, no focal neurologic deficits  Heent: NC AT, neck supple  Pulm: Lungs clear, normal respiratory effort  CV: Heart with regular rate and rhythm, no peripheral  edema  Abd: Abdomen soft, appropriately tender, hypoactive bowel sounds,urostomy in place  MSK: no clubbing, no cyanosis  Skin: no rashes or lesions        Data Review:       Labs:     Recent Labs   Lab 07/05/24  1232 07/08/24  1434 07/09/24  0513   RBC 3.50* 3.35* 2.67*   HGB 9.2* 9.2* 7.0*   HCT 29.7* 28.2* 22.9*   MCV 84.9 84.2 85.8   MCH 26.3 27.5 26.2   MCHC 31.0 32.6 30.6*   RDW 17.0* 16.8* 16.7*   NEPRELIM 8.45*  --  5.36   WBC 15.6* 11.8* 7.3   .0 284.0 264.0         Recent Labs   Lab 07/08/24  1434 07/09/24  0513   * 175*  175*   BUN 9 11  11   CREATSERUM 1.23* 1.00  1.00   EGFRCR 46* 58*  58*   CA 8.6* 7.7*  7.7*   * 137  137   K 5.5* 5.0  5.0    110  110   CO2 18.0* 22.0  22.0       Recent Labs   Lab 07/09/24  0513   ALT <7*   AST 9   ALB 3.1*         Imaging:  No results found.      Meds:      sodium chloride   Intravenous Once    piperacillin-tazobactam  3.375 g Intravenous Q8H    gabapentin  300 mg Oral Nightly    enoxaparin  40 mg Subcutaneous Nightly    sennosides  17.2 mg Oral Nightly    docusate sodium  100 mg Oral BID    acetaminophen  1,000 mg Oral Q6H    ibuprofen  400 mg Oral Q6H    famotidine  20 mg Oral Nightly    Or    famotidine  20 mg Intravenous Nightly    insulin aspart  1-5 Units Subcutaneous TID CC      sodium chloride 100 mL/hr at 07/09/24 0425       magnesium hydroxide    bisacodyl    ondansetron    metoclopramide    traMADol    HYDROmorphone **OR** HYDROmorphone

## 2024-07-09 NOTE — PLAN OF CARE
Patient is alert and oriented x4. Gujaruti speaking. On 3 L of 02. Clear liquid diet. ACHS accuchecks. IVF infusng. Zosyn ABX. Has a R illeostomy, L TOM drain. Has a vaginal packing with orders to be removed at 12pm tomorrow. Call light with in reach. Family at bedside. All safety measures are in place.  Problem: Patient Centered Care  Goal: Patient preferences are identified and integrated in the patient's plan of care  Description: Interventions:  - What would you like us to know as we care for you  - Provide timely, complete, and accurate information to patient/family  - Incorporate patient and family knowledge, values, beliefs, and cultural backgrounds into the planning and delivery of care  - Encourage patient/family to participate in care and decision-making at the level they choose  - Honor patient and family perspectives and choices  Outcome: Progressing     Problem: Diabetes/Glucose Control  Goal: Glucose maintained within prescribed range  Description: INTERVENTIONS:  - Monitor Blood Glucose as ordered  - Assess for signs and symptoms of hyperglycemia and hypoglycemia  - Administer ordered medications to maintain glucose within target range  - Assess barriers to adequate nutritional intake and initiate nutrition consult as needed  - Instruct patient on self management of diabetes  Outcome: Progressing     onal Care Plan goals for specific interventions  Outcome: Progressing

## 2024-07-09 NOTE — PLAN OF CARE
Problem: Patient Centered Care  Goal: Patient preferences are identified and integrated in the patient's plan of care  Description: Interventions:  - What would you like us to know as we care for you? Patient lives with her daughter   - Provide timely, complete, and accurate information to patient/family  - Incorporate patient and family knowledge, values, beliefs, and cultural backgrounds into the planning and delivery of care  - Encourage patient/family to participate in care and decision-making at the level they choose  - Honor patient and family perspectives and choices  Outcome: Progressing   Patient post op day 1. Abdominal lap sites dry intact , right ileal conduit intact, draining clear yellow urine. Left J P drain with serosanguineous output. Vaginal packing removed as ordered. Patient received 1 unit of PRBC  today tolerated transfusion well, repeat  CBC ordered for tomorrow. Clear liquid diet patient denies nausea tolerating liquids. Patient ambulated in halls tolerated activity well, fall precautions in place.

## 2024-07-10 LAB
ANION GAP SERPL CALC-SCNC: 5 MMOL/L (ref 0–18)
BASOPHILS # BLD AUTO: 0.04 X10(3) UL (ref 0–0.2)
BASOPHILS NFR BLD AUTO: 0.5 %
BLOOD TYPE BARCODE: 7300
BUN BLD-MCNC: 11 MG/DL (ref 9–23)
BUN/CREAT SERPL: 11.5 (ref 10–20)
CALCIUM BLD-MCNC: 8 MG/DL (ref 8.7–10.4)
CHLORIDE SERPL-SCNC: 113 MMOL/L (ref 98–112)
CO2 SERPL-SCNC: 20 MMOL/L (ref 21–32)
CREAT BLD-MCNC: 0.96 MG/DL
DEPRECATED RDW RBC AUTO: 55.9 FL (ref 35.1–46.3)
EGFRCR SERPLBLD CKD-EPI 2021: 61 ML/MIN/1.73M2 (ref 60–?)
EOSINOPHIL # BLD AUTO: 1.44 X10(3) UL (ref 0–0.7)
EOSINOPHIL NFR BLD AUTO: 17.4 %
ERYTHROCYTE [DISTWIDTH] IN BLOOD BY AUTOMATED COUNT: 17.3 % (ref 11–15)
GLUCOSE BLD-MCNC: 112 MG/DL (ref 70–99)
GLUCOSE BLDC GLUCOMTR-MCNC: 115 MG/DL (ref 70–99)
GLUCOSE BLDC GLUCOMTR-MCNC: 128 MG/DL (ref 70–99)
GLUCOSE BLDC GLUCOMTR-MCNC: 175 MG/DL (ref 70–99)
GLUCOSE BLDC GLUCOMTR-MCNC: 193 MG/DL (ref 70–99)
HCT VFR BLD AUTO: 28 %
HGB BLD-MCNC: 8.8 G/DL
IMM GRANULOCYTES # BLD AUTO: 0.03 X10(3) UL (ref 0–1)
IMM GRANULOCYTES NFR BLD: 0.4 %
LYMPHOCYTES # BLD AUTO: 1.25 X10(3) UL (ref 1–4)
LYMPHOCYTES NFR BLD AUTO: 15.1 %
MCH RBC QN AUTO: 27.5 PG (ref 26–34)
MCHC RBC AUTO-ENTMCNC: 31.4 G/DL (ref 31–37)
MCV RBC AUTO: 87.5 FL
MONOCYTES # BLD AUTO: 0.34 X10(3) UL (ref 0.1–1)
MONOCYTES NFR BLD AUTO: 4.1 %
NEUTROPHILS # BLD AUTO: 5.16 X10 (3) UL (ref 1.5–7.7)
NEUTROPHILS # BLD AUTO: 5.16 X10(3) UL (ref 1.5–7.7)
NEUTROPHILS NFR BLD AUTO: 62.5 %
OSMOLALITY SERPL CALC.SUM OF ELEC: 286 MOSM/KG (ref 275–295)
PLATELET # BLD AUTO: 252 10(3)UL (ref 150–450)
POTASSIUM SERPL-SCNC: 5 MMOL/L (ref 3.5–5.1)
RBC # BLD AUTO: 3.2 X10(6)UL
SODIUM SERPL-SCNC: 138 MMOL/L (ref 136–145)
UNIT VOLUME: 350 ML
WBC # BLD AUTO: 8.3 X10(3) UL (ref 4–11)

## 2024-07-10 PROCEDURE — 97116 GAIT TRAINING THERAPY: CPT

## 2024-07-10 PROCEDURE — 97530 THERAPEUTIC ACTIVITIES: CPT

## 2024-07-10 PROCEDURE — 97535 SELF CARE MNGMENT TRAINING: CPT

## 2024-07-10 PROCEDURE — 97165 OT EVAL LOW COMPLEX 30 MIN: CPT

## 2024-07-10 PROCEDURE — 99211 OFF/OP EST MAY X REQ PHY/QHP: CPT

## 2024-07-10 PROCEDURE — 97161 PT EVAL LOW COMPLEX 20 MIN: CPT

## 2024-07-10 PROCEDURE — 85025 COMPLETE CBC W/AUTO DIFF WBC: CPT | Performed by: UROLOGY

## 2024-07-10 PROCEDURE — 82962 GLUCOSE BLOOD TEST: CPT

## 2024-07-10 PROCEDURE — 80048 BASIC METABOLIC PNL TOTAL CA: CPT | Performed by: UROLOGY

## 2024-07-10 NOTE — PROGRESS NOTES
07/10/24 1030   Urostomy Ileal conduit RLQ   Placement Date: 07/08/24   Inserted by: Dr Ibarra  Urostomy Type: Ileal conduit  Location: RLQ  Stoma Size (cm): 1.25 cm  Appliance Size: 2 1/4   Stomal Appliance 2 piece;Intact  (2 1/4 inch appliance)   Stomal Assessment Moist;Red;Budding   Peristomal Assessment KASANDRA   Dressing Change Due 07/12/24   Output (mL) 150 mL   Wound Follow Up   Follow up needed Yes     Ostomy Care Services  Following up on the pt. she is sitting up in bed, her son is at the bedside, he translate to her. The pt's appliance is on and intact, I had the pt. emptied her pouch into the container, she did a great job. I will have the staff have the pt. empty her own pouch for practiced and to gain confidence before going home. The nurse is at the bedside. She has no questions.

## 2024-07-10 NOTE — OCCUPATIONAL THERAPY NOTE
OCCUPATIONAL THERAPY EVALUATION - INPATIENT     Room Number: 451/451-A  Evaluation Date: 7/10/2024  Type of Evaluation: Initial  Presenting Problem: bladder cancer s/p robotic radical cystectomy 7/8; acute on chronic anemia  Hx DM     Physician Order: IP Consult to Occupational Therapy  Reason for Therapy: ADL/IADL Dysfunction and Discharge Planning    OCCUPATIONAL THERAPY ASSESSMENT   Patient is a 74 year old female admitted 7/8/2024 for bladder cancer s/p robotic radical cystectomy 7/8; acute on chronic anemia. Patient is Gujarati-speaking; OT used phone  to communicate with patient throughout evaluation. Prior to admission, patient's baseline is independent with basic ADLs and MI for fx mobility using RW.  Patient is currently functioning near baseline with ADLs and fx mobility/transfers.  Patient is requiring up to CGA for ADLs as a result of the following impairments: decreased functional strength, decreased endurance, impaired balance, and decreased muscular endurance. Occupational Therapy will continue to follow for duration of hospitalization.    Patient will benefit from continued skilled OT Services at discharge to promote prior level of function and safety with additional support and return home with home health OT    PLAN  OT Treatment Plan: Balance activities;Energy conservation/work simplification techniques;ADL training;Functional transfer training;Endurance training;Patient/Family education;Patient/Family training;Equipment eval/education;Compensatory technique education  OT Device Recommendations: Shower chair (vs. tub transfer bench)    OCCUPATIONAL THERAPY MEDICAL/SOCIAL HISTORY   Problem List  Active Problems:    Bladder cancer (HCC)    HOME SITUATION  Type of Home: House  Home Layout: Multi-level  Lives With: Family (son and daughter in law)  Toilet and Equipment: Comfort height toilet  Shower/Tub and Equipment: Tub-shower combo  Drives: No  Stairs in Home: 6 steps upstairs from  basement with railing  Use of Assistive Device(s): RW    SUBJECTIVE  \"Washroom?\"    OCCUPATIONAL THERAPY EXAMINATION    OBJECTIVE  Precautions: Abdominal protective strategies;  needed (urostomy; drain; Gujarati-speaking)  Fall Risk: Standard fall risk    PAIN ASSESSMENT  Ratin    ACTIVITY TOLERANCE  Pulse: 66  Heart Rate Source: Monitor     BP: 151/84  BP Location: Left arm  BP Method: Automatic  Patient Position: Sitting    O2 SATURATIONS  Oxygen Therapy  SPO2% Ambulation on Room Air: 100    COGNITION  Overall Cognitive Status:  WFL - within functional limits    SENSATION  Light touch:  intact    RANGE OF MOTION   Upper extremity ROM is within functional limits     STRENGTH ASSESSMENT  Upper extremity strength is within functional limits     COORDINATION  Gross Motor: WFL   Fine Motor: WFL     ACTIVITIES OF DAILY LIVING ASSESSMENT  AM-PAC ‘6-Clicks’ Inpatient Daily Activity Short Form  How much help from another person does the patient currently need…  -   Putting on and taking off regular lower body clothing?: A Little  -   Bathing (including washing, rinsing, drying)?: A Little  -   Toileting, which includes using toilet, bedpan or urinal? : A Little  -   Putting on and taking off regular upper body clothing?: None  -   Taking care of personal grooming such as brushing teeth?: A Little  -   Eating meals?: None    AM-PAC Score:  Score: 20  Approx Degree of Impairment: 38.32%  Standardized Score (AM-PAC Scale): 42.03  CMS Modifier (G-Code): CJ    BED MOBILITY  Supine to Sit: SUP     FUNCTIONAL TRANSFER ASSESSMENT  Sit to Stand from EOB: SBA  Toilet Transfer: SBA  Chair Transfer: SBA    FUNCTIONAL MOBILITY  CGA progressing to SBA for hallway fx mobility using RW    FUNCTIONAL ADL ASSESSMENT  Eating: independent seated (per obs)   Grooming: stand-by assist standing at sink  UB Dressing: independent seated (per obs)   LB Dressing: contact guard assist for d/d briefs  Toileting: supervision seated for  urostomy management and pericares after BM    EDUCATION PROVIDED  Patient: Role of Occupational Therapy; Plan of Care; Discharge Recommendations; Functional Transfer Techniques; Fall Prevention; Posture/Positioning; Proper Body Mechanics; Energy Conservation; DME Recommendations  Patient's Response to Education: Verbalized Understanding; Returned Demonstration  Family/Caregiver: Role of Occupational Therapy; Discharge Recommendations; Plan of Care; DME Recommendations  Family/Caregiver's Response to Education: Verbalized Understanding    The patient's Approx Degree of Impairment: 38.32% has been calculated based on documentation in the [facility] '6 clicks' Inpatient Daily Activity Short Form.  Research supports that patients with this level of impairment may benefit from HH OT.  Final disposition will be made by interdisciplinary medical team.     Patient End of Session: Up in chair;Needs met;Call light within reach;RN aware of session/findings;All patient questions and concerns addressed;Family present    OT Goals  Patients self stated goal is: none stated     Patient will complete functional transfer with MI  Comment:     Patient will complete toileting with MI  Comment:     Patient will complete LB dressing with MI  Comment:    Patient will complete item retrieval with MI  Comment:          Goals  on: 24  Frequency: 3-5x/week    Patient Evaluation Complexity Level:   Occupational Profile/Medical History LOW - Brief history including review of medical or therapy records    Specific performance deficits impacting engagement in ADL/IADL MODERATE  3 - 5 performance deficits   Client Assessment/Performance Deficits MODERATE - Comorbidities and min to mod modifications of tasks    Clinical Decision Making LOW - Analysis of occupational profile, problem-focused assessments, limited treatment options    Overall Complexity LOW     OT Session Time  Self-Care Home Management: 12 minutes  Therapeutic Activity: 15  minutes    Nella Witt, OTR/L  [facility]  #[id number]

## 2024-07-10 NOTE — PLAN OF CARE
Michael is alert and oriented x4, on RA. Son at bedside for support and assistance with translation. SCDs on bilaterally. Lovenox on. No complaints of NV. ACHS blood glucose monitoring overnight. Ileo conduit to right side attached to do drainage bag in place, I&Os, and site management. Some vaginal drainage present, but improving, serosanguineous. 1x-assist/stand-by with walker. Left TOM drain in place, monitoring output, dressing changed d/t drainage at site. Pain managed with scheduled Tylenol and Motrin. Zosyn completed during the shift. IVF infusing with 0.9 at 100 ml/hr. Plan pending course, safety measures in place, call light within reach.      Problem: Patient Centered Care  Goal: Patient preferences are identified and integrated in the patient's plan of care  Description: Interventions:  - What would you like us to know as we care for you? From home with family  - Provide timely, complete, and accurate information to patient/family  - Incorporate patient and family knowledge, values, beliefs, and cultural backgrounds into the planning and delivery of care  - Encourage patient/family to participate in care and decision-making at the level they choose  - Honor patient and family perspectives and choices  Outcome: Progressing     Problem: Diabetes/Glucose Control  Goal: Glucose maintained within prescribed range  Description: INTERVENTIONS:  - Monitor Blood Glucose as ordered  - Assess for signs and symptoms of hyperglycemia and hypoglycemia  - Administer ordered medications to maintain glucose within target range  - Assess barriers to adequate nutritional intake and initiate nutrition consult as needed  - Instruct patient on self management of diabetes  Outcome: Progressing     Problem: Patient/Family Goals  Goal: Patient/Family Long Term Goal  Description: Patient's Long Term Goal:     Interventions:  -   - See additional Care Plan goals for specific interventions  Outcome: Progressing  Goal:  Patient/Family Short Term Goal  Description: Patient's Short Term Goal:     Interventions:   -   - See additional Care Plan goals for specific interventions  Outcome: Progressing     Problem: PAIN - ADULT  Goal: Verbalizes/displays adequate comfort level or patient's stated pain goal  Description: INTERVENTIONS:  - Encourage pt to monitor pain and request assistance  - Assess pain using appropriate pain scale  - Administer analgesics based on type and severity of pain and evaluate response  - Implement non-pharmacological measures as appropriate and evaluate response  - Consider cultural and social influences on pain and pain management  - Manage/alleviate anxiety  - Utilize distraction and/or relaxation techniques  - Monitor for opioid side effects  - Notify MD/LIP if interventions unsuccessful or patient reports new pain  - Anticipate increased pain with activity and pre-medicate as appropriate  Outcome: Progressing     Problem: RISK FOR INFECTION - ADULT  Goal: Absence of fever/infection during anticipated neutropenic period  Description: INTERVENTIONS  - Monitor WBC  - Administer growth factors as ordered  - Implement neutropenic guidelines  Outcome: Progressing     Problem: SAFETY ADULT - FALL  Goal: Free from fall injury  Description: INTERVENTIONS:  - Assess pt frequently for physical needs  - Identify cognitive and physical deficits and behaviors that affect risk of falls.  - Koppel fall precautions as indicated by assessment.  - Educate pt/family on patient safety including physical limitations  - Instruct pt to call for assistance with activity based on assessment  - Modify environment to reduce risk of injury  - Provide assistive devices as appropriate  - Consider OT/PT consult to assist with strengthening/mobility  - Encourage toileting schedule  Outcome: Progressing     Problem: DISCHARGE PLANNING  Goal: Discharge to home or other facility with appropriate resources  Description: INTERVENTIONS:  -  Identify barriers to discharge w/pt and caregiver  - Include patient/family/discharge partner in discharge planning  - Arrange for needed discharge resources and transportation as appropriate  - Identify discharge learning needs (meds, wound care, etc)  - Arrange for interpreters to assist at discharge as needed  - Consider post-discharge preferences of patient/family/discharge partner  - Complete POLST form as appropriate  - Assess patient's ability to be responsible for managing their own health  - Refer to Case Management Department for coordinating discharge planning if the patient needs post-hospital services based on physician/LIP order or complex needs related to functional status, cognitive ability or social support system  Outcome: Progressing     Problem: GASTROINTESTINAL - ADULT  Goal: Minimal or absence of nausea and vomiting  Description: INTERVENTIONS:  - Maintain adequate hydration with IV or PO as ordered and tolerated  - Nasogastric tube to low intermittent suction as ordered  - Evaluate effectiveness of ordered antiemetic medications  - Provide nonpharmacologic comfort measures as appropriate  - Advance diet as tolerated, if ordered  - Obtain nutritional consult as needed  - Evaluate fluid balance  Outcome: Progressing  Goal: Maintains or returns to baseline bowel function  Description: INTERVENTIONS:  - Assess bowel function  - Maintain adequate hydration with IV or PO as ordered and tolerated  - Evaluate effectiveness of GI medications  - Encourage mobilization and activity  - Obtain nutritional consult as needed  - Establish a toileting routine/schedule  - Consider collaborating with pharmacy to review patient's medication profile  Outcome: Progressing     Problem: GENITOURINARY - ADULT  Goal: Absence of urinary retention  Description: INTERVENTIONS:  - Assess patient’s ability to void and empty bladder  - Monitor intake/output and perform bladder scan as needed  - Follow urinary retention  protocol/standard of care  - Consider collaborating with pharmacy to review patient's medication profile  - Implement strategies to promote bladder emptying  Outcome: Progressing     Problem: METABOLIC/FLUID AND ELECTROLYTES - ADULT  Goal: Electrolytes maintained within normal limits  Description: INTERVENTIONS:  - Monitor labs and rhythm and assess patient for signs and symptoms of electrolyte imbalances  - Administer electrolyte replacement as ordered  - Monitor response to electrolyte replacements, including rhythm and repeat lab results as appropriate  - Fluid restriction as ordered  - Instruct patient on fluid and nutrition restrictions as appropriate  Outcome: Progressing     Problem: SKIN/TISSUE INTEGRITY - ADULT  Goal: Skin integrity remains intact  Description: INTERVENTIONS  - Assess and document risk factors for pressure ulcer development  - Assess and document skin integrity  - Monitor for areas of redness and/or skin breakdown  - Initiate interventions, skin care algorithm/standards of care as needed  Outcome: Progressing  Goal: Incision(s), wounds(s) or drain site(s) healing without S/S of infection  Description: INTERVENTIONS:  - Assess and document risk factors for pressure ulcer development  - Assess and document skin integrity  - Assess and document dressing/incision, wound bed, drain sites and surrounding tissue  - Implement wound care per orders  - Initiate isolation precautions as appropriate  - Initiate Pressure Ulcer prevention bundle as indicated  Outcome: Progressing     Problem: HEMATOLOGIC - ADULT  Goal: Maintains hematologic stability  Description: INTERVENTIONS  - Assess for signs and symptoms of bleeding or hemorrhage  - Monitor labs and vital signs for trends  - Administer supportive blood products/factors, fluids and medications as ordered and appropriate  - Administer supportive blood products/factors as ordered and appropriate  Outcome: Progressing  Goal: Free from bleeding  injury  Description: (Example usage: patient with low platelets)  INTERVENTIONS:  - Avoid intramuscular injections, enemas and rectal medication administration  - Ensure safe mobilization of patient  - Hold pressure on venipuncture sites to achieve adequate hemostasis  - Assess for signs and symptoms of internal bleeding  - Monitor lab trends  - Patient is to report abnormal signs of bleeding to staff  - Avoid use of toothpicks and dental floss  - Use electric shaver for shaving  - Use soft bristle tooth brush  - Limit straining and forceful nose blowing  Outcome: Progressing     Problem: MUSCULOSKELETAL - ADULT  Goal: Return mobility to safest level of function  Description: INTERVENTIONS:  - Assess patient stability and activity tolerance for standing, transferring and ambulating w/ or w/o assistive devices  - Assist with transfers and ambulation using safe patient handling equipment as needed  - Ensure adequate protection for wounds/incisions during mobilization  - Obtain PT/OT consults as needed  - Advance activity as appropriate  - Communicate ordered activity level and limitations with patient/family  Outcome: Progressing  Goal: Maintain proper alignment of affected body part  Description: INTERVENTIONS:  - Support and protect limb and body alignment per provider's orders  - Instruct and reinforce with patient and family use of appropriate assistive device and precautions (e.g. spinal or hip dislocation precautions)  Outcome: Progressing

## 2024-07-10 NOTE — HOME CARE LIAISON
Received referral via [facility] for Home Health services. Spoke w/ patient, , and daughter in law who is agreeable with [facility]. Contact information placed on AVS.

## 2024-07-10 NOTE — DISCHARGE INSTRUCTIONS
Sometimes managing your health at home requires assistance.  The [facility] team has recognized your preference to use [facility].  They can be reached by phone at [phone].  The fax number for your reference is [phone].  A representative from the home health agency will contact you or your family to schedule your first visit.

## 2024-07-10 NOTE — PROGRESS NOTES
[facility] Hospitalist Progress Note     CC: Hospital Follow up    PCP: SOURAV FARR       Assessment/Plan:     Active Problems:    Bladder cancer (HCC)  Patient is a 74 year old female with PMH sig for DM, and bladder CA who presented to the hospital for radical cystectomy, ileal conduit urinary diversion.      Bladder CA  - s/p lap radical cystectomy, ileal conduit urinary diversion POD # 2  - prn pain medication   - monitor respiratory status  - encouraged IS use  - prn anti emetics  - dvt ppx: lovenox  - rest of management per urology    Acute on chronic anemia  - hgb of 7 this morning, was 9.2 prior  - also with borderline low BP, will transfuse 1 unit of pRBC, no need for post transfusion CBC  - hgb stable     DM  - holding glipizide, sitagliptin, and metformin, resume on dc  - ISS, accuchek     FN:  - IVF:none  - Diet: LFD     DVT Prophy: lovenox    Lines: PIV    Dispo: pending clinical course, still with a lot of pain    Thank You,  Jo-Ann Cottrell,     Hospitalist with [facility]     Subjective:     Feels well. Tolerating clears and plan to try solids. Still with a lot of pain, son at bedside    OBJECTIVE:    Blood pressure 101/76, pulse 60, temperature 97.8 °F (36.6 °C), temperature source Oral, resp. rate 18, height 5' 5\" (1.651 m), weight 123 lb (55.8 kg), SpO2 99%.    Temp:  [97.6 °F (36.4 °C)-98.4 °F (36.9 °C)] 97.8 °F (36.6 °C)  Pulse:  [54-60] 60  Resp:  [14-20] 18  BP: (101-118)/(58-76) 101/76  SpO2:  [94 %-99 %] 99 %      Intake/Output:    Intake/Output Summary (Last 24 hours) at 7/10/2024 1343  Last data filed at 7/10/2024 1200  Gross per 24 hour   Intake 3307.84 ml   Output 2845 ml   Net 462.84 ml       Last 3 Weights   07/08/24 0626 123 lb (55.8 kg)   06/25/24 1207 130 lb (59 kg)   07/05/24 1304 120 lb (54.4 kg)       Exam   Gen: No acute distress, alert and oriented x3, no focal neurologic deficits  Heent: NC AT, neck supple  Pulm: Lungs clear, normal  respiratory effort  CV: Heart with regular rate and rhythm, no peripheral edema  Abd: Abdomen soft, appropriately tender, hypoactive bowel sounds,urostomy in place  MSK: no clubbing, no cyanosis  Skin: no rashes or lesions        Data Review:       Labs:     Recent Labs   Lab 07/05/24  1232 07/08/24  1434 07/09/24  0513 07/10/24  0543   RBC 3.50* 3.35* 2.67* 3.20*   HGB 9.2* 9.2* 7.0* 8.8*   HCT 29.7* 28.2* 22.9* 28.0*   MCV 84.9 84.2 85.8 87.5   MCH 26.3 27.5 26.2 27.5   MCHC 31.0 32.6 30.6* 31.4   RDW 17.0* 16.8* 16.7* 17.3*   NEPRELIM 8.45*  --  5.36 5.16   WBC 15.6* 11.8* 7.3 8.3   .0 284.0 264.0 252.0         Recent Labs   Lab 07/08/24  1434 07/09/24  0513 07/10/24  0543   * 175*  175* 112*   BUN 9 11  11 11   CREATSERUM 1.23* 1.00  1.00 0.96   EGFRCR 46* 58*  58* 61   CA 8.6* 7.7*  7.7* 8.0*   * 137  137 138   K 5.5* 5.0  5.0 5.0    110  110 113*   CO2 18.0* 22.0  22.0 20.0*       Recent Labs   Lab 07/09/24  0513   ALT <7*   AST 9   ALB 3.1*         Imaging:  No results found.      Meds:      gabapentin  300 mg Oral Nightly    enoxaparin  40 mg Subcutaneous Nightly    sennosides  17.2 mg Oral Nightly    docusate sodium  100 mg Oral BID    acetaminophen  1,000 mg Oral Q6H    ibuprofen  400 mg Oral Q6H    famotidine  20 mg Oral Nightly    Or    famotidine  20 mg Intravenous Nightly    insulin aspart  1-5 Units Subcutaneous TID CC           glycerin-hypromellose-    magnesium hydroxide    bisacodyl    ondansetron    metoclopramide    traMADol    HYDROmorphone **OR** HYDROmorphone

## 2024-07-10 NOTE — PROGRESS NOTES
[facility]  Urology Progress Note    Michael Ibarra Patient Status:  Inpatient    1950 MRN [id number]   Location [facility] 4W/SW/SE Attending Lenin Ibarra MD   Hosp Day # 2 PCP SOURAV FARR     Subjective:  Michael Ibarra is a(n) 74 year old female.    Hospital course to date: POD #2 robotic radical cystectomy, anterior exenteration, bilateral pelvic lymph node dissection, and ileal conduit     Current complaints: The patient feels okay. She tolerated clears and is passing flatus. She ambulated once yesterday.    Objective:  General appearance: alert, cooperative, and no distress  Blood pressure 101/76, pulse 59, temperature 97.8 °F (36.6 °C), temperature source Oral, resp. rate 20, height 5' 5\" (1.651 m), weight 123 lb (55.8 kg), SpO2 99%.  Lungs: Respirations non labored.  Abdomen: Soft, non distended. Incisions without signs of infection.  : Conduit draining clear yellow urine. Stents in place.     Lab Results   Component Value Date    WBC 8.3 07/10/2024    HGB 8.8 07/10/2024    HCT 28.0 07/10/2024    .0 07/10/2024    CREATSERUM 0.96 07/10/2024    BUN 11 07/10/2024     07/10/2024    K 5.0 07/10/2024     07/10/2024    CO2 20.0 07/10/2024     07/10/2024    CA 8.0 07/10/2024    PGLU 139 2024       Assessment:  Bladder cancer s/p robotic radical cystectomy, anterior exenteration, bilateral pelvic lymph node dissection, and ileal conduit     Plan:  She continues to do well. Ambulate more today and advance diet. Her hemoglobin increased appropriately with transfusion.    Andrzej Mendosa MD  7/10/2024  7:50 AM

## 2024-07-10 NOTE — PLAN OF CARE
Problem: Patient Centered Care  Goal: Patient preferences are identified and integrated in the patient's plan of care  Description: Interventions:  - What would you like us to know as we care for you? From home with family  - Provide timely, complete, and accurate information to patient/family  - Incorporate patient and family knowledge, values, beliefs, and cultural backgrounds into the planning and delivery of care  - Encourage patient/family to participate in care and decision-making at the level they choose  - Honor patient and family perspectives and choices  Outcome: Progressing   Patient post op day 2, abdominal incision dry, clean, intact. Right side ileal conduit intact, patient able to self empty the bag. L side TOM drain with serous output, dressing dry/intact. Patient is tolerating low fiber diet, denies nausea. Abdominal pain managed with Tylenol. Patient has been ambulating in the room and in halls, tolerating activity well, fall precautions in place.  Plan for discharge home with home health services. Family at bedside very supportive.

## 2024-07-10 NOTE — PHYSICAL THERAPY NOTE
PHYSICAL THERAPY EVALUATION - INPATIENT     Room Number: 451/451-A  Evaluation Date: 7/10/2024  Type of Evaluation: Initial   Physician Order: PT Eval and Treat    Presenting Problem: Laparscopic radical cystectomy  Co-Morbidities : bladder CA  Reason for Therapy: Mobility Dysfunction and Discharge Planning    PHYSICAL THERAPY ASSESSMENT   Patient is a 74 year old female admitted 7/8/2024 for XI robotic assisted laparoscopic radical cystectomy, ileal conduit urinary diversion, bilateral pelvic lymph node dissection .      Prior to admission, patient's baseline is lives with family indep with all mobility and ADL's.  Patient is currently functioning below baseline with bed mobility, transfers, gait, stair negotiation, maintaining seated position, standing prolonged periods, and performing household tasks.  Patient is requiring supervision as a result of the following impairments: decreased functional strength, decreased endurance/aerobic capacity, impaired standing balance, decreased muscular endurance, and medical status.  Physical Therapy will continue to follow for duration of hospitalization.    Patient will benefit from continued skilled PT Services For duration of hospitalization, however, given the patient is functioning near baseline level do not anticipate skilled therapy needs at discharge .    PLAN  PT Treatment Plan: Bed mobility;Body mechanics;Endurance;Energy conservation;Patient education;Gait training;Range of motion;Strengthening;Transfer training;Balance training;Family education;Stoop training  Rehab Potential : Good  Frequency (Obs): 5x/week    PHYSICAL THERAPY MEDICAL/SOCIAL HISTORY   History related to current admission: Active Problems:    Bladder cancer (HCC)  Patient is a 74 year old female with PMH sig for DM, and bladder CA who presented to the hospital for radical cystectomy, ileal conduit urinary diversion.      Bladder CA  - s/p lap radical cystectomy, ileal conduit urinary diversion POD  # 1  - prn pain medication   - monitor respiratory status  - encouraged IS use  - prn anti emetics  - dvt ppx: lovenox  - rest of management per urology     Problem List  Active Problems:    Bladder cancer (HCC)      HOME SITUATION  Home Situation  Type of Home: House  Home Layout: Multi-level  Stairs to Enter : 2  Railing: Yes  Stairs to Bedroom: 8  Railing: Yes  Lives With: Spouse;Family  Patient Owned Equipment: None     Prior Level of [place]: Lives at home with family indep with all mobility and ADL's.     SUBJECTIVE  I feel sore with movement but I can walk and get around with the RW and I have home family assistance to get better.     PHYSICAL THERAPY EXAMINATION   OBJECTIVE  Precautions: Abdominal protective strategies  Fall Risk: Standard fall risk    WEIGHT BEARING RESTRICTION  Weight Bearing Restriction: None                PAIN ASSESSMENT  Ratin  Location: abd sx site with movement  Management Techniques: Activity promotion;Body mechanics;Breathing techniques;Relaxation;Repositioning    COGNITION  Overall Cognitive Status:  WFL - within functional limits  Aliya Fitzgerald contract  utilized  RANGE OF MOTION AND STRENGTH ASSESSMENT  Upper extremity ROM and strength are within functional limits   Lower extremity ROM is within functional limits   Lower extremity strength is within functional limits     BALANCE  Static Sitting: Good  Dynamic Sitting: Fair +  Static Standing: Fair  Dynamic Standing: Fair -      ACTIVITY TOLERANCE  Pulse: 60  Heart Rate Source: Monitor  Resp: 18  BP: 101/76  BP Location: Left arm  BP Method: Automatic  Patient Position: Sitting    O2 WALK       AM-PAC '6-Clicks' INPATIENT SHORT FORM - BASIC MOBILITY  How much difficulty does the patient currently have...  Patient Difficulty: Turning over in bed (including adjusting bedclothes, sheets and blankets)?: None   Patient Difficulty: Sitting down on and standing up from a chair with arms (e.g., wheelchair, bedside  commode, etc.): None   Patient Difficulty: Moving from lying on back to sitting on the side of the bed?: None   How much help from another person does the patient currently need...   Help from Another: Moving to and from a bed to a chair (including a wheelchair)?: A Little   Help from Another: Need to walk in hospital room?: A Little   Help from Another: Climbing 3-5 steps with a railing?: A Little     AM-PAC Score:  Raw Score: 21   Approx Degree of Impairment: 28.97%   Standardized Score (AM-PAC Scale): 50.25   CMS Modifier (G-Code): CJ    FUNCTIONAL ABILITY STATUS  Functional Mobility/Gait Assessment  Gait Assistance: Supervision  Distance (ft): 150  Assistive Device: Rolling walker  Pattern:  (decreased step length and tra)  Rolling: independent  Supine to Sit: independent  Sit to Supine: supervision  Sit to Stand: supervision    Exercise/Education Provided:  Bed mobility  Body mechanics  Energy conservation  Functional activity tolerated  Gait training  Posture  Strengthening  Lower therapeutic exercise:  Ankle pumps  LAQ  Transfer training    The patient's Approx Degree of Impairment: 28.97% has been calculated based on documentation in the [facility] '6 clicks' Inpatient Basic Mobility Short Form.  Research supports that patients with this level of impairment may benefit from Home family assist.  Final disposition will be made by interdisciplinary medical team.    Patient End of Session: Up in chair;With  staff;Needs met;Call light within reach;RN aware of session/findings;All patient questions and concerns addressed;Alarm set    CURRENT GOALS  Goals to be met by: 7/25/2024  Patient Goal Patient's self-stated goal is: return home   Goal #1 Patient is able to demonstrate supine - sit EOB @ level: independent     Goal #1   Current Status    Goal #2 Patient is able to demonstrate transfers Sit to/from Stand at assistance level: modified independent with walker - rolling     Goal #2  Current Status    Goal #3  Patient is able to ambulate 300 feet with assist device: walker - rolling at assistance level: modified independent   Goal #3   Current Status    Goal #4 Patient will negotiate 10 stairs/one curb w/ assistive device and supervision   Goal #4   Current Status    Goal #5 Patient to demonstrate independence with home activity/exercise instructions provided to patient in preparation for discharge.   Goal #5   Current Status    Goal #6    Goal #6  Current Status      Patient Evaluation Complexity Level:  History Low - no personal factors and/or co-morbidities   Examination of body systems Low -  addressing 1-2 elements   Clinical Presentation Low- Stable   Clinical Decision Making  Low Complexity     Gait Training: 15 minutes

## 2024-07-11 LAB
ANION GAP SERPL CALC-SCNC: 5 MMOL/L (ref 0–18)
BASOPHILS # BLD AUTO: 0.03 X10(3) UL (ref 0–0.2)
BASOPHILS NFR BLD AUTO: 0.3 %
BUN BLD-MCNC: 13 MG/DL (ref 9–23)
BUN/CREAT SERPL: 14.1 (ref 10–20)
CALCIUM BLD-MCNC: 7.9 MG/DL (ref 8.7–10.4)
CHLORIDE SERPL-SCNC: 114 MMOL/L (ref 98–112)
CO2 SERPL-SCNC: 21 MMOL/L (ref 21–32)
CREAT BLD-MCNC: 0.92 MG/DL
CREAT FLD-MCNC: 0.74 MG/DL
DEPRECATED RDW RBC AUTO: 54.8 FL (ref 35.1–46.3)
EGFRCR SERPLBLD CKD-EPI 2021: 65 ML/MIN/1.73M2 (ref 60–?)
EOSINOPHIL # BLD AUTO: 1.79 X10(3) UL (ref 0–0.7)
EOSINOPHIL NFR BLD AUTO: 20.7 %
ERYTHROCYTE [DISTWIDTH] IN BLOOD BY AUTOMATED COUNT: 17.5 % (ref 11–15)
GLUCOSE BLD-MCNC: 105 MG/DL (ref 70–99)
GLUCOSE BLDC GLUCOMTR-MCNC: 127 MG/DL (ref 70–99)
GLUCOSE BLDC GLUCOMTR-MCNC: 166 MG/DL (ref 70–99)
GLUCOSE BLDC GLUCOMTR-MCNC: 171 MG/DL (ref 70–99)
GLUCOSE BLDC GLUCOMTR-MCNC: 315 MG/DL (ref 70–99)
HCT VFR BLD AUTO: 27.8 %
HGB BLD-MCNC: 9 G/DL
IMM GRANULOCYTES # BLD AUTO: 0.03 X10(3) UL (ref 0–1)
IMM GRANULOCYTES NFR BLD: 0.3 %
LYMPHOCYTES # BLD AUTO: 1.28 X10(3) UL (ref 1–4)
LYMPHOCYTES NFR BLD AUTO: 14.8 %
MCH RBC QN AUTO: 27.6 PG (ref 26–34)
MCHC RBC AUTO-ENTMCNC: 32.4 G/DL (ref 31–37)
MCV RBC AUTO: 85.3 FL
MONOCYTES # BLD AUTO: 0.34 X10(3) UL (ref 0.1–1)
MONOCYTES NFR BLD AUTO: 3.9 %
NEUTROPHILS # BLD AUTO: 5.19 X10 (3) UL (ref 1.5–7.7)
NEUTROPHILS # BLD AUTO: 5.19 X10(3) UL (ref 1.5–7.7)
NEUTROPHILS NFR BLD AUTO: 60 %
OSMOLALITY SERPL CALC.SUM OF ELEC: 290 MOSM/KG (ref 275–295)
PLATELET # BLD AUTO: 286 10(3)UL (ref 150–450)
POTASSIUM SERPL-SCNC: 4.5 MMOL/L (ref 3.5–5.1)
RBC # BLD AUTO: 3.26 X10(6)UL
SODIUM SERPL-SCNC: 140 MMOL/L (ref 136–145)
WBC # BLD AUTO: 8.7 X10(3) UL (ref 4–11)

## 2024-07-11 PROCEDURE — 80048 BASIC METABOLIC PNL TOTAL CA: CPT | Performed by: UROLOGY

## 2024-07-11 PROCEDURE — 99211 OFF/OP EST MAY X REQ PHY/QHP: CPT

## 2024-07-11 PROCEDURE — 82962 GLUCOSE BLOOD TEST: CPT

## 2024-07-11 PROCEDURE — 85025 COMPLETE CBC W/AUTO DIFF WBC: CPT | Performed by: UROLOGY

## 2024-07-11 PROCEDURE — 82570 ASSAY OF URINE CREATININE: CPT | Performed by: UROLOGY

## 2024-07-11 RX ORDER — ENOXAPARIN SODIUM 100 MG/ML
40 INJECTION SUBCUTANEOUS DAILY
Qty: 8.4 EACH | Refills: 0 | Status: SHIPPED | OUTPATIENT
Start: 2024-07-11 | End: 2024-08-01

## 2024-07-11 NOTE — PLAN OF CARE
Patient alert and oriented, family at bedside to help translate, declined use of language line. Pain managed with ibuprofen & tylenol. Urostomy, able to self empty, TOM in place, sent for creatinine. BM today. Tolerating diet, denies nausea. Up with assist and walker, ambulating in hallways, up in chair. Call light within reach, using appropriately.     Problem: Diabetes/Glucose Control  Goal: Glucose maintained within prescribed range  Description: INTERVENTIONS:  - Monitor Blood Glucose as ordered  - Assess for signs and symptoms of hyperglycemia and hypoglycemia  - Administer ordered medications to maintain glucose within target range  - Assess barriers to adequate nutritional intake and initiate nutrition consult as needed  - Instruct patient on self management of diabetes  Outcome: Progressing     Problem: PAIN - ADULT  Goal: Verbalizes/displays adequate comfort level or patient's stated pain goal  Description: INTERVENTIONS:  - Encourage pt to monitor pain and request assistance  - Assess pain using appropriate pain scale  - Administer analgesics based on type and severity of pain and evaluate response  - Implement non-pharmacological measures as appropriate and evaluate response  - Consider cultural and social influences on pain and pain management  - Manage/alleviate anxiety  - Utilize distraction and/or relaxation techniques  - Monitor for opioid side effects  - Notify MD/LIP if interventions unsuccessful or patient reports new pain  - Anticipate increased pain with activity and pre-medicate as appropriate  Outcome: Progressing     Problem: RISK FOR INFECTION - ADULT  Goal: Absence of fever/infection during anticipated neutropenic period  Description: INTERVENTIONS  - Monitor WBC  - Administer growth factors as ordered  - Implement neutropenic guidelines  Outcome: Progressing     Problem: SAFETY ADULT - FALL  Goal: Free from fall injury  Description: INTERVENTIONS:  - Assess pt frequently for physical  needs  - Identify cognitive and physical deficits and behaviors that affect risk of falls.  - [place] fall precautions as indicated by assessment.  - Educate pt/family on patient safety including physical limitations  - Instruct pt to call for assistance with activity based on assessment  - Modify environment to reduce risk of injury  - Provide assistive devices as appropriate  - Consider OT/PT consult to assist with strengthening/mobility  - Encourage toileting schedule  Outcome: Progressing     Problem: DISCHARGE PLANNING  Goal: Discharge to home or other facility with appropriate resources  Description: INTERVENTIONS:  - Identify barriers to discharge w/pt and caregiver  - Include patient/family/discharge partner in discharge planning  - Arrange for needed discharge resources and transportation as appropriate  - Identify discharge learning needs (meds, wound care, etc)  - Arrange for interpreters to assist at discharge as needed  - Consider post-discharge preferences of patient/family/discharge partner  - Complete POLST form as appropriate  - Assess patient's ability to be responsible for managing their own health  - Refer to Case Management Department for coordinating discharge planning if the patient needs post-hospital services based on physician/LIP order or complex needs related to functional status, cognitive ability or social support system  Outcome: Progressing     Problem: GASTROINTESTINAL - ADULT  Goal: Minimal or absence of nausea and vomiting  Description: INTERVENTIONS:  - Maintain adequate hydration with IV or PO as ordered and tolerated  - Nasogastric tube to low intermittent suction as ordered  - Evaluate effectiveness of ordered antiemetic medications  - Provide nonpharmacologic comfort measures as appropriate  - Advance diet as tolerated, if ordered  - Obtain nutritional consult as needed  - Evaluate fluid balance  Outcome: Progressing  Goal: Maintains or returns to baseline bowel  function  Description: INTERVENTIONS:  - Assess bowel function  - Maintain adequate hydration with IV or PO as ordered and tolerated  - Evaluate effectiveness of GI medications  - Encourage mobilization and activity  - Obtain nutritional consult as needed  - Establish a toileting routine/schedule  - Consider collaborating with pharmacy to review patient's medication profile  Outcome: Progressing     Problem: GENITOURINARY - ADULT  Goal: Absence of urinary retention  Description: INTERVENTIONS:  - Assess patient’s ability to void and empty bladder  - Monitor intake/output and perform bladder scan as needed  - Follow urinary retention protocol/standard of care  - Consider collaborating with pharmacy to review patient's medication profile  - Implement strategies to promote bladder emptying  Outcome: Progressing     Problem: METABOLIC/FLUID AND ELECTROLYTES - ADULT  Goal: Electrolytes maintained within normal limits  Description: INTERVENTIONS:  - Monitor labs and rhythm and assess patient for signs and symptoms of electrolyte imbalances  - Administer electrolyte replacement as ordered  - Monitor response to electrolyte replacements, including rhythm and repeat lab results as appropriate  - Fluid restriction as ordered  - Instruct patient on fluid and nutrition restrictions as appropriate  Outcome: Progressing     Problem: SKIN/TISSUE INTEGRITY - ADULT  Goal: Skin integrity remains intact  Description: INTERVENTIONS  - Assess and document risk factors for pressure ulcer development  - Assess and document skin integrity  - Monitor for areas of redness and/or skin breakdown  - Initiate interventions, skin care algorithm/standards of care as needed  Outcome: Progressing  Goal: Incision(s), wounds(s) or drain site(s) healing without S/S of infection  Description: INTERVENTIONS:  - Assess and document risk factors for pressure ulcer development  - Assess and document skin integrity  - Assess and document dressing/incision,  wound bed, drain sites and surrounding tissue  - Implement wound care per orders  - Initiate isolation precautions as appropriate  - Initiate Pressure Ulcer prevention bundle as indicated  Outcome: Progressing     Problem: HEMATOLOGIC - ADULT  Goal: Maintains hematologic stability  Description: INTERVENTIONS  - Assess for signs and symptoms of bleeding or hemorrhage  - Monitor labs and vital signs for trends  - Administer supportive blood products/factors, fluids and medications as ordered and appropriate  - Administer supportive blood products/factors as ordered and appropriate  Outcome: Progressing  Goal: Free from bleeding injury  Description: (Example usage: patient with low platelets)  INTERVENTIONS:  - Avoid intramuscular injections, enemas and rectal medication administration  - Ensure safe mobilization of patient  - Hold pressure on venipuncture sites to achieve adequate hemostasis  - Assess for signs and symptoms of internal bleeding  - Monitor lab trends  - Patient is to report abnormal signs of bleeding to staff  - Avoid use of toothpicks and dental floss  - Use electric shaver for shaving  - Use soft bristle tooth brush  - Limit straining and forceful nose blowing  Outcome: Progressing     Problem: MUSCULOSKELETAL - ADULT  Goal: Return mobility to safest level of function  Description: INTERVENTIONS:  - Assess patient stability and activity tolerance for standing, transferring and ambulating w/ or w/o assistive devices  - Assist with transfers and ambulation using safe patient handling equipment as needed  - Ensure adequate protection for wounds/incisions during mobilization  - Obtain PT/OT consults as needed  - Advance activity as appropriate  - Communicate ordered activity level and limitations with patient/family  Outcome: Progressing  Goal: Maintain proper alignment of affected body part  Description: INTERVENTIONS:  - Support and protect limb and body alignment per provider's orders  - Instruct and  reinforce with patient and family use of appropriate assistive device and precautions (e.g. spinal or hip dislocation precautions)  Outcome: Progressing

## 2024-07-11 NOTE — PROGRESS NOTES
[facility] Hospitalist Progress Note     CC: Hospital Follow up    PCP: SOURAV FARR       Assessment/Plan:     Active Problems:    Bladder cancer (HCC)    Patient is a 74 year old female with PMH sig for DM, and bladder CA who presented to the hospital for radical cystectomy, ileal conduit urinary diversion.      Bladder CA  - s/p lap radical cystectomy, ileal conduit urinary diversion   - prn pain medication - pain is now well controlled   - monitor respiratory status  - encouraged IS use  - prn anti emetics  - dvt ppx: lovenox  - rest of management per urology  Aim for discharge over weekend is my understanding , sat vs Sunday, discussed with daughter and son at bedside      Acute on chronic anemia  - hgb stable after 1 unit  Trend CBC       DM  - holding glipizide, sitagliptin, and metformin, resume on dc  - ISS, accuchek     FN:  - IVF:none  - Diet: Low fiber soft diet      DVT Prophy: lovenox    Lines: PIV      Questions/concerns were discussed with patient and/or family by bedside.      Thank You,  Lamberto Zuluaga, DO    Hospitalist with [facility]     Subjective:     No pain, no fevers, feels well, walking ok ( daughter used as )     OBJECTIVE:    Blood pressure 149/84, pulse 51, temperature 97.7 °F (36.5 °C), temperature source Oral, resp. rate 20, height 5' 5\" (1.651 m), weight 123 lb (55.8 kg), SpO2 100%.    Temp:  [97.7 °F (36.5 °C)-98.3 °F (36.8 °C)] 97.7 °F (36.5 °C)  Pulse:  [51-66] 51  Resp:  [18-20] 20  BP: (138-151)/(76-84) 149/84  SpO2:  [100 %] 100 %      Intake/Output:    Intake/Output Summary (Last 24 hours) at 7/11/2024 1046  Last data filed at 7/11/2024 1030  Gross per 24 hour   Intake 360 ml   Output 2718 ml   Net -2358 ml       Last 3 Weights   07/08/24 0626 123 lb (55.8 kg)   06/25/24 1207 130 lb (59 kg)   07/05/24 1304 120 lb (54.4 kg)       Exam   Gen: No acute distress, alert and oriented x3  Pulm: Lungs clear, normal respiratory effort  CV: Heart  with regular rate and rhythm, no peripheral edema  Abd: Drain in left lower quadrant, serous sanguinous fluids in drain , urostomy in right lower quadrant   MSK: no clubbing, no cyanosis  Skin: no rashes or lesions        Data Review:       Labs:     Recent Labs   Lab 07/09/24  0513 07/10/24  0543 07/11/24  0607   RBC 2.67* 3.20* 3.26*   HGB 7.0* 8.8* 9.0*   HCT 22.9* 28.0* 27.8*   MCV 85.8 87.5 85.3   MCH 26.2 27.5 27.6   MCHC 30.6* 31.4 32.4   RDW 16.7* 17.3* 17.5*   NEPRELIM 5.36 5.16 5.19   WBC 7.3 8.3 8.7   .0 252.0 286.0         Recent Labs   Lab 07/09/24  0513 07/10/24  0543 07/11/24  0607   *  175* 112* 105*   BUN 11  11 11 13   CREATSERUM 1.00  1.00 0.96 0.92   EGFRCR 58*  58* 61 65   CA 7.7*  7.7* 8.0* 7.9*     137 138 140   K 5.0  5.0 5.0 4.5     110 113* 114*   CO2 22.0  22.0 20.0* 21.0       Recent Labs   Lab 07/09/24  0513   ALT <7*   AST 9   ALB 3.1*         Imaging:  No results found.      Meds:      gabapentin  300 mg Oral Nightly    enoxaparin  40 mg Subcutaneous Nightly    sennosides  17.2 mg Oral Nightly    docusate sodium  100 mg Oral BID    acetaminophen  1,000 mg Oral Q6H    ibuprofen  400 mg Oral Q6H    famotidine  20 mg Oral Nightly    Or    famotidine  20 mg Intravenous Nightly    insulin aspart  1-5 Units Subcutaneous TID CC         glycerin-hypromellose-    magnesium hydroxide    bisacodyl    ondansetron    metoclopramide    traMADol    HYDROmorphone **OR** HYDROmorphone

## 2024-07-11 NOTE — PROGRESS NOTES
07/11/24 1030   Urostomy Ileal conduit RLQ   Placement Date: 07/08/24   Inserted by: Dr Ibarra  Urostomy Type: Ileal conduit  Location: RLQ  Stoma Size (cm): 1.25 cm  Appliance Size: 2 1/4   Stomal Appliance 2 piece;Intact   Stomal Assessment Moist;Pink;Red;Budding  (2 stents intact)   Peristomal Assessment KASANDRA   Dressing Change Due 07/12/24   Wound Follow Up   Follow up needed Yes     Ostomy Care Services  Follow up on the pt. to continue ileal conduit care and teaching. The pt. is up in the chair, pt.c/o right abdominal pain, surgical pain. I will notify the nurse. No family here at this time. The pt. emptied her pouch and did a great job. She has no questions, She and her daughter in law will change the appliance tomorrow. Call light in reach.

## 2024-07-11 NOTE — PLAN OF CARE
Michael is alert and oriented x4, on RA. Son at bedside for support and assistance with translation. SCDs on bilaterally. Lovenox on. No complaints of NV. ACHS blood glucose monitoring overnight. Ileo conduit to right side attached to do drainage bag in place, I&Os, and site management. Some vaginal drainage present, but improving, serosanguineous, hygiene care prn. 1x-assist/stand-by with walker. Left TOM drain in place, monitoring output, dressing changed prn if drainage present. Pain managed with scheduled Tylenol and Motrin. Plan pending course, safety measures in place, call light within reach.      Problem: Patient Centered Care  Goal: Patient preferences are identified and integrated in the patient's plan of care  Description: Interventions:  - What would you like us to know as we care for you? From home with family  - Provide timely, complete, and accurate information to patient/family  - Incorporate patient and family knowledge, values, beliefs, and cultural backgrounds into the planning and delivery of care  - Encourage patient/family to participate in care and decision-making at the level they choose  - Honor patient and family perspectives and choices  Outcome: Progressing     Problem: Diabetes/Glucose Control  Goal: Glucose maintained within prescribed range  Description: INTERVENTIONS:  - Monitor Blood Glucose as ordered  - Assess for signs and symptoms of hyperglycemia and hypoglycemia  - Administer ordered medications to maintain glucose within target range  - Assess barriers to adequate nutritional intake and initiate nutrition consult as needed  - Instruct patient on self management of diabetes  Outcome: Progressing     Problem: Patient/Family Goals  Goal: Patient/Family Long Term Goal  Description: Patient's Long Term Goal:     Interventions:  -   - See additional Care Plan goals for specific interventions  Outcome: Progressing  Goal: Patient/Family Short Term Goal  Description: Patient's  Short Term Goal:     Interventions:   -   - See additional Care Plan goals for specific interventions  Outcome: Progressing     Problem: PAIN - ADULT  Goal: Verbalizes/displays adequate comfort level or patient's stated pain goal  Description: INTERVENTIONS:  - Encourage pt to monitor pain and request assistance  - Assess pain using appropriate pain scale  - Administer analgesics based on type and severity of pain and evaluate response  - Implement non-pharmacological measures as appropriate and evaluate response  - Consider cultural and social influences on pain and pain management  - Manage/alleviate anxiety  - Utilize distraction and/or relaxation techniques  - Monitor for opioid side effects  - Notify MD/LIP if interventions unsuccessful or patient reports new pain  - Anticipate increased pain with activity and pre-medicate as appropriate  Outcome: Progressing     Problem: RISK FOR INFECTION - ADULT  Goal: Absence of fever/infection during anticipated neutropenic period  Description: INTERVENTIONS  - Monitor WBC  - Administer growth factors as ordered  - Implement neutropenic guidelines  Outcome: Progressing     Problem: SAFETY ADULT - FALL  Goal: Free from fall injury  Description: INTERVENTIONS:  - Assess pt frequently for physical needs  - Identify cognitive and physical deficits and behaviors that affect risk of falls.  - [place] fall precautions as indicated by assessment.  - Educate pt/family on patient safety including physical limitations  - Instruct pt to call for assistance with activity based on assessment  - Modify environment to reduce risk of injury  - Provide assistive devices as appropriate  - Consider OT/PT consult to assist with strengthening/mobility  - Encourage toileting schedule  Outcome: Progressing     Problem: DISCHARGE PLANNING  Goal: Discharge to home or other facility with appropriate resources  Description: INTERVENTIONS:  - Identify barriers to discharge w/pt and caregiver  -  Include patient/family/discharge partner in discharge planning  - Arrange for needed discharge resources and transportation as appropriate  - Identify discharge learning needs (meds, wound care, etc)  - Arrange for interpreters to assist at discharge as needed  - Consider post-discharge preferences of patient/family/discharge partner  - Complete POLST form as appropriate  - Assess patient's ability to be responsible for managing their own health  - Refer to Case Management Department for coordinating discharge planning if the patient needs post-hospital services based on physician/LIP order or complex needs related to functional status, cognitive ability or social support system  Outcome: Progressing     Problem: GASTROINTESTINAL - ADULT  Goal: Minimal or absence of nausea and vomiting  Description: INTERVENTIONS:  - Maintain adequate hydration with IV or PO as ordered and tolerated  - Nasogastric tube to low intermittent suction as ordered  - Evaluate effectiveness of ordered antiemetic medications  - Provide nonpharmacologic comfort measures as appropriate  - Advance diet as tolerated, if ordered  - Obtain nutritional consult as needed  - Evaluate fluid balance  Outcome: Progressing  Goal: Maintains or returns to baseline bowel function  Description: INTERVENTIONS:  - Assess bowel function  - Maintain adequate hydration with IV or PO as ordered and tolerated  - Evaluate effectiveness of GI medications  - Encourage mobilization and activity  - Obtain nutritional consult as needed  - Establish a toileting routine/schedule  - Consider collaborating with pharmacy to review patient's medication profile  Outcome: Progressing     Problem: GENITOURINARY - ADULT  Goal: Absence of urinary retention  Description: INTERVENTIONS:  - Assess patient’s ability to void and empty bladder  - Monitor intake/output and perform bladder scan as needed  - Follow urinary retention protocol/standard of care  - Consider collaborating with  pharmacy to review patient's medication profile  - Implement strategies to promote bladder emptying  Outcome: Progressing     Problem: METABOLIC/FLUID AND ELECTROLYTES - ADULT  Goal: Electrolytes maintained within normal limits  Description: INTERVENTIONS:  - Monitor labs and rhythm and assess patient for signs and symptoms of electrolyte imbalances  - Administer electrolyte replacement as ordered  - Monitor response to electrolyte replacements, including rhythm and repeat lab results as appropriate  - Fluid restriction as ordered  - Instruct patient on fluid and nutrition restrictions as appropriate  Outcome: Progressing     Problem: SKIN/TISSUE INTEGRITY - ADULT  Goal: Skin integrity remains intact  Description: INTERVENTIONS  - Assess and document risk factors for pressure ulcer development  - Assess and document skin integrity  - Monitor for areas of redness and/or skin breakdown  - Initiate interventions, skin care algorithm/standards of care as needed  Outcome: Progressing  Goal: Incision(s), wounds(s) or drain site(s) healing without S/S of infection  Description: INTERVENTIONS:  - Assess and document risk factors for pressure ulcer development  - Assess and document skin integrity  - Assess and document dressing/incision, wound bed, drain sites and surrounding tissue  - Implement wound care per orders  - Initiate isolation precautions as appropriate  - Initiate Pressure Ulcer prevention bundle as indicated  Outcome: Progressing     Problem: HEMATOLOGIC - ADULT  Goal: Maintains hematologic stability  Description: INTERVENTIONS  - Assess for signs and symptoms of bleeding or hemorrhage  - Monitor labs and vital signs for trends  - Administer supportive blood products/factors, fluids and medications as ordered and appropriate  - Administer supportive blood products/factors as ordered and appropriate  Outcome: Progressing  Goal: Free from bleeding injury  Description: (Example usage: patient with low  platelets)  INTERVENTIONS:  - Avoid intramuscular injections, enemas and rectal medication administration  - Ensure safe mobilization of patient  - Hold pressure on venipuncture sites to achieve adequate hemostasis  - Assess for signs and symptoms of internal bleeding  - Monitor lab trends  - Patient is to report abnormal signs of bleeding to staff  - Avoid use of toothpicks and dental floss  - Use electric shaver for shaving  - Use soft bristle tooth brush  - Limit straining and forceful nose blowing  Outcome: Progressing     Problem: MUSCULOSKELETAL - ADULT  Goal: Return mobility to safest level of function  Description: INTERVENTIONS:  - Assess patient stability and activity tolerance for standing, transferring and ambulating w/ or w/o assistive devices  - Assist with transfers and ambulation using safe patient handling equipment as needed  - Ensure adequate protection for wounds/incisions during mobilization  - Obtain PT/OT consults as needed  - Advance activity as appropriate  - Communicate ordered activity level and limitations with patient/family  Outcome: Progressing  Goal: Maintain proper alignment of affected body part  Description: INTERVENTIONS:  - Support and protect limb and body alignment per provider's orders  - Instruct and reinforce with patient and family use of appropriate assistive device and precautions (e.g. spinal or hip dislocation precautions)  Outcome: Progressing

## 2024-07-11 NOTE — PROGRESS NOTES
[facility]  part of [facility]    Progress Note    Michael Ibarra Patient Status:  Inpatient    1950 MRN [id number]   Location [facility] 4W/SW/SE Attending Lenin Ibarra MD   Hosp Day # 3 PCP SOURAV FARR     Subjective:   Michael Ibarra is a(n) 74 year old female     POD#3  Pain controlled with tylenol and motrin  Had 2 BMs yesterday  Some back pain - chronic for patiet  Ambulating  Incisional pain    Objective:   Blood pressure 149/84, pulse 51, temperature 97.7 °F (36.5 °C), temperature source Oral, resp. rate 20, height 65\", weight 123 lb (55.8 kg), SpO2 100%.    /84 (BP Location: Right arm)   Pulse 51   Temp 97.7 °F (36.5 °C) (Oral)   Resp 20   Ht 65\"   Wt 123 lb (55.8 kg)   SpO2 100%   BMI 20.47 kg/m²   AAOx3  GENERAL APPEARANCE: a well-developed, well-nourished, in no apparent distress   HEENT: normocephalic, no icterus  NECK supple, No supraclavicular masses  LUNGS: breathing comfortably, no dyspnea, no use of accessory muscles  CV: normal temperature, no lower extremity edema  ABDOMEN: abd soft, nontender, stoma pink, urine clear, drain serous  NO LE edema      Results:   Lab Results   Component Value Date    WBC 8.7 2024    HGB 9.0 (L) 2024    HCT 27.8 (L) 2024    .0 2024    CREATSERUM 0.92 2024    BUN 13 2024     2024    K 4.5 2024     (H) 2024    CO2 21.0 2024     (H) 2024    CA 7.9 (L) 2024    ALB 3.1 (L) 2024    ALKPHO 63 2024    BILT 0.2 2024    TP 6.0 2024    AST 9 2024    ALT <7 (L) 2024       No results found.        Assessment & Plan:     Bladder cancer (HCC)  Squamous cell carcinoma   S/p radical cystectomy, ileal conduit   POD#3   HD stable post transfusion   Doing well   Good urine output   Send TOM drain for creatinine   Labs reviewed   Home health needed     Needs to learn  lovenox injections at home x 3 weeks  Discharge home over the weekend      **Certification      PHYSICIAN Certification of Need for Inpatient Hospitalization - Initial Certification    Patient will require inpatient services that will reasonably be expected to span two midnight's based on the clinical documentation in H+P.   Based on patients current state of illness, I anticipate that, after discharge, patient will require TBD.      Lenin Ibarra MD  7/11/2024

## 2024-07-11 NOTE — DIETARY NOTE
Brief Nutrition Note:     Pt admitted for bladder cancer. Pt screened at nutrition risk for decreased appetite and unintentional weight loss. Chart reviewed, POD#3 s/p laparoscopic radical cystectomy, ileal conduit urinary diversion.  Diet advanced to low fiber/soft yesterday. Discussion with RN, eating well - tolerating solids and liquids. Intake reviewed 50-75% x 3 meals. Currently on low fiber/soft diet - noted vegetarian. Pt visited, easily awoke to name, son at bedside. Pt/son report good intake/appetite. Denies weight loss. Current weight 123#. EMR weight review, limited weight history. Per EMR weight review, pt noted weighing 127# 13.9 oz on 5/19/24. Pt appears well-nourished. Pt appears at no nutrition risk at this time. F/u at length of stay (LOS) per protocol. Please consult nutrition services if earlier intervention is indicated.    Wt Readings from Last 6 Encounters:   07/08/24 55.8 kg (123 lb)   07/05/24 54.4 kg (120 lb)     Patient Weight(s) for the past 336 hrs:   Weight   07/08/24 0626 55.8 kg (123 lb)     Percent Meals Eaten (last 6 days)       Date/Time Percent Meals Eaten (%)    07/09/24 1520 75 %    07/10/24 0900 --     Percent Meals Eaten (%): 100% veg broth/lemon IT ice at 07/10/24 0900    07/11/24 1030 50 %    07/11/24 1445 75 %          Vanesa Walker MS, SMITHA, RDN, LDN  Clinical Dietitian  P: [phone]

## 2024-07-12 VITALS
HEART RATE: 66 BPM | BODY MASS INDEX: 20.49 KG/M2 | WEIGHT: 123 LBS | OXYGEN SATURATION: 100 % | HEIGHT: 65 IN | DIASTOLIC BLOOD PRESSURE: 77 MMHG | SYSTOLIC BLOOD PRESSURE: 143 MMHG | RESPIRATION RATE: 18 BRPM | TEMPERATURE: 98 F

## 2024-07-12 LAB
ANION GAP SERPL CALC-SCNC: 4 MMOL/L (ref 0–18)
BASOPHILS # BLD AUTO: 0.02 X10(3) UL (ref 0–0.2)
BASOPHILS NFR BLD AUTO: 0.2 %
BUN BLD-MCNC: 14 MG/DL (ref 9–23)
BUN/CREAT SERPL: 15.4 (ref 10–20)
CALCIUM BLD-MCNC: 8.2 MG/DL (ref 8.7–10.4)
CHLORIDE SERPL-SCNC: 114 MMOL/L (ref 98–112)
CO2 SERPL-SCNC: 21 MMOL/L (ref 21–32)
CREAT BLD-MCNC: 0.91 MG/DL
DEPRECATED RDW RBC AUTO: 55.4 FL (ref 35.1–46.3)
EGFRCR SERPLBLD CKD-EPI 2021: 65 ML/MIN/1.73M2 (ref 60–?)
EOSINOPHIL # BLD AUTO: 1.81 X10(3) UL (ref 0–0.7)
EOSINOPHIL NFR BLD AUTO: 20.5 %
ERYTHROCYTE [DISTWIDTH] IN BLOOD BY AUTOMATED COUNT: 17.7 % (ref 11–15)
GLUCOSE BLD-MCNC: 136 MG/DL (ref 70–99)
GLUCOSE BLDC GLUCOMTR-MCNC: 133 MG/DL (ref 70–99)
GLUCOSE BLDC GLUCOMTR-MCNC: 270 MG/DL (ref 70–99)
GLUCOSE BLDC GLUCOMTR-MCNC: 294 MG/DL (ref 70–99)
HCT VFR BLD AUTO: 28.7 %
HGB BLD-MCNC: 9.4 G/DL
IMM GRANULOCYTES # BLD AUTO: 0.04 X10(3) UL (ref 0–1)
IMM GRANULOCYTES NFR BLD: 0.5 %
LYMPHOCYTES # BLD AUTO: 1.43 X10(3) UL (ref 1–4)
LYMPHOCYTES NFR BLD AUTO: 16.2 %
MCH RBC QN AUTO: 28 PG (ref 26–34)
MCHC RBC AUTO-ENTMCNC: 32.8 G/DL (ref 31–37)
MCV RBC AUTO: 85.4 FL
MONOCYTES # BLD AUTO: 0.42 X10(3) UL (ref 0.1–1)
MONOCYTES NFR BLD AUTO: 4.8 %
NEUTROPHILS # BLD AUTO: 5.12 X10 (3) UL (ref 1.5–7.7)
NEUTROPHILS # BLD AUTO: 5.12 X10(3) UL (ref 1.5–7.7)
NEUTROPHILS NFR BLD AUTO: 57.8 %
OSMOLALITY SERPL CALC.SUM OF ELEC: 291 MOSM/KG (ref 275–295)
PLATELET # BLD AUTO: 309 10(3)UL (ref 150–450)
POTASSIUM SERPL-SCNC: 4.5 MMOL/L (ref 3.5–5.1)
RBC # BLD AUTO: 3.36 X10(6)UL
SODIUM SERPL-SCNC: 139 MMOL/L (ref 136–145)
WBC # BLD AUTO: 8.8 X10(3) UL (ref 4–11)

## 2024-07-12 PROCEDURE — 85025 COMPLETE CBC W/AUTO DIFF WBC: CPT | Performed by: UROLOGY

## 2024-07-12 PROCEDURE — 80048 BASIC METABOLIC PNL TOTAL CA: CPT | Performed by: UROLOGY

## 2024-07-12 PROCEDURE — 99214 OFFICE O/P EST MOD 30 MIN: CPT

## 2024-07-12 PROCEDURE — 82962 GLUCOSE BLOOD TEST: CPT

## 2024-07-12 RX ORDER — TRAMADOL HYDROCHLORIDE 50 MG/1
50 TABLET ORAL EVERY 6 HOURS PRN
Qty: 15 TABLET | Refills: 0 | Status: SHIPPED | OUTPATIENT
Start: 2024-07-12

## 2024-07-12 RX ORDER — PSEUDOEPHEDRINE HCL 30 MG
100 TABLET ORAL 2 TIMES DAILY
Qty: 60 CAPSULE | Refills: 0 | Status: SHIPPED | OUTPATIENT
Start: 2024-07-12

## 2024-07-12 RX ORDER — FLUTICASONE PROPIONATE 50 MCG
1 SPRAY, SUSPENSION (ML) NASAL DAILY
Status: DISCONTINUED | OUTPATIENT
Start: 2024-07-12 | End: 2024-07-12

## 2024-07-12 RX ORDER — POLYETHYLENE GLYCOL 3350 17 G/17G
17 POWDER, FOR SOLUTION ORAL DAILY
Status: DISCONTINUED | OUTPATIENT
Start: 2024-07-12 | End: 2024-07-12

## 2024-07-12 NOTE — PROGRESS NOTES
[facility]  part of [facility]    Progress Note    Michael Ibarra Patient Status:  Inpatient    1950 MRN [id number]   Location [facility] 4W/SW/SE Attending Lenin Ibarra MD   Hosp Day # 4 PCP SOURAV FARR     Subjective:   Michael Ibarra is a(n) 74 year old female     POD#4  Pain controlled with tylenol and motrin  Had 2 BMs yesterday  Some back pain - chronic for patiet  Ambulating      Objective:   Blood pressure 143/77, pulse 66, temperature 98.4 °F (36.9 °C), temperature source Oral, resp. rate 18, height 65\", weight 123 lb (55.8 kg), SpO2 100%.    /77 (BP Location: Right arm)   Pulse 66   Temp 98.4 °F (36.9 °C) (Oral)   Resp 18   Ht 65\"   Wt 123 lb (55.8 kg)   SpO2 100%   BMI 20.47 kg/m²   AAOx3  GENERAL APPEARANCE: a well-developed, well-nourished, in no apparent distress   HEENT: normocephalic, no icterus  NECK supple, No supraclavicular masses  LUNGS: breathing comfortably, no dyspnea, no use of accessory muscles  CV: normal temperature, no lower extremity edema  ABDOMEN: abd soft, nontender, stoma pink, urine clear, drain serous  NO LE edema        Intake/Output Summary (Last 24 hours) at 2024 1230  Last data filed at 2024 1058  Gross per 24 hour   Intake 300 ml   Output 2230 ml   Net -1930 ml         Results:   Lab Results   Component Value Date    WBC 8.8 2024    HGB 9.4 (L) 2024    HCT 28.7 (L) 2024    .0 2024    CREATSERUM 0.91 2024    BUN 14 2024     2024    K 4.5 2024     (H) 2024    CO2 21.0 2024     (H) 2024    CA 8.2 (L) 2024    ALB 3.1 (L) 2024    ALKPHO 63 2024    BILT 0.2 2024    TP 6.0 2024    AST 9 2024    ALT <7 (L) 2024       No results found.        Assessment & Plan:     Bladder cancer (HCC)  Squamous cell carcinoma   S/p radical cystectomy, ileal  conduit   POD#4   HD stable post transfusion   Doing well   Good urine output   Send TOM drain for creatinine - normal levels - checked   Keep TOM - likely will go home with TOM   Labs reviewed   Home health needed     Needs to learn lovenox injections at home x 3 weeks  Discharge home over the weekend      **Certification      PHYSICIAN Certification of Need for Inpatient Hospitalization - Initial Certification    Patient will require inpatient services that will reasonably be expected to span two midnight's based on the clinical documentation in H+P.   Based on patients current state of illness, I anticipate that, after discharge, patient will require TBD.      Lenin Ibarra MD  7/11/2024

## 2024-07-12 NOTE — CM/SW NOTE
07/12/24 1700   Discharge disposition   Expected discharge disposition Home-Health   Post Acute Care Provider [facility]   Discharge transportation Private car     Pt discussed during nursing rounds. Pt is stable for PR today. MD dc order entered. [facility] will provide RN and therapy services at [facility]. BELKIS LVM with admissions at [facility] that patient is requesting RN visit on 7/13. CM requested that provide Lovenox injection teaching to daughter at bedside. Pt's daughter will provide transport at PR.    Plan: Home w/family with [facility] today.    / to remain available for support and/or discharge planning.     VNINY LopezN    [phone]

## 2024-07-12 NOTE — PROGRESS NOTES
07/12/24 1500   Urostomy Ileal conduit RLQ   Placement Date: 07/08/24   Inserted by: Dr Ibarra  Urostomy Type: Ileal conduit  Location: RLQ  Stoma Size (cm): 1.25 cm  Appliance Size: 2 1/4   Stomal Appliance 2 piece;Intact  (changed for teaching)   Stomal Assessment Moist;Edema;Budding;Red  (11/8 stoma)   Peristomal Assessment Pink;Red;Intact  (crusted the cory stoma skin, pt. had a lot of mucous on the stoma)   Treatment Appliance change;Bag change;Site care  (done by daughter in law)   Dressing Change Due 07/18/24   Output (mL) 125 mL  (clear yellow)   Wound Follow Up   Follow up needed Yes     Ostomy Care Services  Follow up to continue the ileal conduit teaching, The pt. emptied her pouch, she did a great. Her daughter and daughter in law are here for education. The daughter in law changed the appliance and she did well, I gave them ostomy instructions and some supplies for home. Reviewed all the information with them, All questions answered, Left a do pouch with a connector at the bedside for the pt. to connect her pouch to tonight, Spoke with the nurse.

## 2024-07-12 NOTE — PLAN OF CARE
A&ox4 RA. Lovenox and sukumar hose for dvt prophylaxis. Pain managed w. Scheduled tylenol and motrin. Voiding in R side ileal conduit. Unable to obtain correct connector from unit to attach to do bag for gravity drainage. Emptied frequently overnight. TOM to L side w/ dressing intact. Accuchecks ac/hs. Up w/ stby and walker. Tolerating low fiber/soft diet. R hand PIVs saline locked. Call light in reach, daughter remains at bedside to translate.   Problem: Patient Centered Care  Goal: Patient preferences are identified and integrated in the patient's plan of care  Description: Interventions:  - What would you like us to know as we care for you? From home with family  - Provide timely, complete, and accurate information to patient/family  - Incorporate patient and family knowledge, values, beliefs, and cultural backgrounds into the planning and delivery of care  - Encourage patient/family to participate in care and decision-making at the level they choose  - Honor patient and family perspectives and choices  Outcome: Progressing     Problem: PAIN - ADULT  Goal: Verbalizes/displays adequate comfort level or patient's stated pain goal  Description: INTERVENTIONS:  - Encourage pt to monitor pain and request assistance  - Assess pain using appropriate pain scale  - Administer analgesics based on type and severity of pain and evaluate response  - Implement non-pharmacological measures as appropriate and evaluate response  - Consider cultural and social influences on pain and pain management  - Manage/alleviate anxiety  - Utilize distraction and/or relaxation techniques  - Monitor for opioid side effects  - Notify MD/LIP if interventions unsuccessful or patient reports new pain  - Anticipate increased pain with activity and pre-medicate as appropriate  Outcome: Progressing     Problem: GASTROINTESTINAL - ADULT  Goal: Minimal or absence of nausea and vomiting  Description: INTERVENTIONS:  - Maintain adequate hydration with IV  or PO as ordered and tolerated  - Nasogastric tube to low intermittent suction as ordered  - Evaluate effectiveness of ordered antiemetic medications  - Provide nonpharmacologic comfort measures as appropriate  - Advance diet as tolerated, if ordered  - Obtain nutritional consult as needed  - Evaluate fluid balance  Outcome: Progressing  Goal: Maintains or returns to baseline bowel function  Description: INTERVENTIONS:  - Assess bowel function  - Maintain adequate hydration with IV or PO as ordered and tolerated  - Evaluate effectiveness of GI medications  - Encourage mobilization and activity  - Obtain nutritional consult as needed  - Establish a toileting routine/schedule  - Consider collaborating with pharmacy to review patient's medication profile  Outcome: Progressing     Problem: SKIN/TISSUE INTEGRITY - ADULT  Goal: Skin integrity remains intact  Description: INTERVENTIONS  - Assess and document risk factors for pressure ulcer development  - Assess and document skin integrity  - Monitor for areas of redness and/or skin breakdown  - Initiate interventions, skin care algorithm/standards of care as needed  Outcome: Progressing  Goal: Incision(s), wounds(s) or drain site(s) healing without S/S of infection  Description: INTERVENTIONS:  - Assess and document risk factors for pressure ulcer development  - Assess and document skin integrity  - Assess and document dressing/incision, wound bed, drain sites and surrounding tissue  - Implement wound care per orders  - Initiate isolation precautions as appropriate  - Initiate Pressure Ulcer prevention bundle as indicated  Outcome: Progressing

## 2024-07-13 NOTE — PLAN OF CARE
Problem: Patient Centered Care  Goal: Patient preferences are identified and integrated in the patient's plan of care  Description: Interventions:  - What would you like us to know as we care for you? From home with family  - Provide timely, complete, and accurate information to patient/family  - Incorporate patient and family knowledge, values, beliefs, and cultural backgrounds into the planning and delivery of care  - Encourage patient/family to participate in care and decision-making at the level they choose  - Honor patient and family perspectives and choices  Outcome: Adequate for Discharge     Problem: Diabetes/Glucose Control  Goal: Glucose maintained within prescribed range  Description: INTERVENTIONS:  - Monitor Blood Glucose as ordered  - Assess for signs and symptoms of hyperglycemia and hypoglycemia  - Administer ordered medications to maintain glucose within target range  - Assess barriers to adequate nutritional intake and initiate nutrition consult as needed  - Instruct patient on self management of diabetes  Outcome: Adequate for Discharge          Problem: PAIN - ADULT  Goal: Verbalizes/displays adequate comfort level or patient's stated pain goal  Description: INTERVENTIONS:  - Encourage pt to monitor pain and request assistance  - Assess pain using appropriate pain scale  - Administer analgesics based on type and severity of pain and evaluate response  - Implement non-pharmacological measures as appropriate and evaluate response  - Consider cultural and social influences on pain and pain management  - Manage/alleviate anxiety  - Utilize distraction and/or relaxation techniques  - Monitor for opioid side effects  - Notify MD/LIP if interventions unsuccessful or patient reports new pain  - Anticipate increased pain with activity and pre-medicate as appropriate  Outcome: Adequate for Discharge     Problem: RISK FOR INFECTION - ADULT  Goal: Absence of fever/infection during anticipated neutropenic  period  Description: INTERVENTIONS  - Monitor WBC  - Administer growth factors as ordered  - Implement neutropenic guidelines  Outcome: Adequate for Discharge     Problem: SAFETY ADULT - FALL  Goal: Free from fall injury  Description: INTERVENTIONS:  - Assess pt frequently for physical needs  - Identify cognitive and physical deficits and behaviors that affect risk of falls.  - [place] fall precautions as indicated by assessment.  - Educate pt/family on patient safety including physical limitations  - Instruct pt to call for assistance with activity based on assessment  - Modify environment to reduce risk of injury  - Provide assistive devices as appropriate  - Consider OT/PT consult to assist with strengthening/mobility  - Encourage toileting schedule  Outcome: Adequate for Discharge     Problem: DISCHARGE PLANNING  Goal: Discharge to home or other facility with appropriate resources  Description: INTERVENTIONS:  - Identify barriers to discharge w/pt and caregiver  - Include patient/family/discharge partner in discharge planning  - Arrange for needed discharge resources and transportation as appropriate  - Identify discharge learning needs (meds, wound care, etc)  - Arrange for interpreters to assist at discharge as needed  - Consider post-discharge preferences of patient/family/discharge partner  - Complete POLST form as appropriate  - Assess patient's ability to be responsible for managing their own health  - Refer to Case Management Department for coordinating discharge planning if the patient needs post-hospital services based on physician/LIP order or complex needs related to functional status, cognitive ability or social support system  Outcome: Adequate for Discharge     Problem: GASTROINTESTINAL - ADULT  Goal: Minimal or absence of nausea and vomiting  Description: INTERVENTIONS:  - Maintain adequate hydration with IV or PO as ordered and tolerated  - Nasogastric tube to low intermittent suction as  ordered  - Evaluate effectiveness of ordered antiemetic medications  - Provide nonpharmacologic comfort measures as appropriate  - Advance diet as tolerated, if ordered  - Obtain nutritional consult as needed  - Evaluate fluid balance  Outcome: Adequate for Discharge  Goal: Maintains or returns to baseline bowel function  Description: INTERVENTIONS:  - Assess bowel function  - Maintain adequate hydration with IV or PO as ordered and tolerated  - Evaluate effectiveness of GI medications  - Encourage mobilization and activity  - Obtain nutritional consult as needed  - Establish a toileting routine/schedule  - Consider collaborating with pharmacy to review patient's medication profile  Outcome: Adequate for Discharge     Problem: GENITOURINARY - ADULT  Goal: Absence of urinary retention  Description: INTERVENTIONS:  - Assess patient’s ability to void and empty bladder  - Monitor intake/output and perform bladder scan as needed  - Follow urinary retention protocol/standard of care  - Consider collaborating with pharmacy to review patient's medication profile  - Implement strategies to promote bladder emptying  Outcome: Adequate for Discharge     Problem: METABOLIC/FLUID AND ELECTROLYTES - ADULT  Goal: Electrolytes maintained within normal limits  Description: INTERVENTIONS:  - Monitor labs and rhythm and assess patient for signs and symptoms of electrolyte imbalances  - Administer electrolyte replacement as ordered  - Monitor response to electrolyte replacements, including rhythm and repeat lab results as appropriate  - Fluid restriction as ordered  - Instruct patient on fluid and nutrition restrictions as appropriate  Outcome: Adequate for Discharge     Problem: SKIN/TISSUE INTEGRITY - ADULT  Goal: Skin integrity remains intact  Description: INTERVENTIONS  - Assess and document risk factors for pressure ulcer development  - Assess and document skin integrity  - Monitor for areas of redness and/or skin breakdown  -  Initiate interventions, skin care algorithm/standards of care as needed  Outcome: Adequate for Discharge  Goal: Incision(s), wounds(s) or drain site(s) healing without S/S of infection  Description: INTERVENTIONS:  - Assess and document risk factors for pressure ulcer development  - Assess and document skin integrity  - Assess and document dressing/incision, wound bed, drain sites and surrounding tissue  - Implement wound care per orders  - Initiate isolation precautions as appropriate  - Initiate Pressure Ulcer prevention bundle as indicated  Outcome: Adequate for Discharge     Problem: HEMATOLOGIC - ADULT  Goal: Maintains hematologic stability  Description: INTERVENTIONS  - Assess for signs and symptoms of bleeding or hemorrhage  - Monitor labs and vital signs for trends  - Administer supportive blood products/factors, fluids and medications as ordered and appropriate  - Administer supportive blood products/factors as ordered and appropriate  Outcome: Adequate for Discharge  Goal: Free from bleeding injury  Description: (Example usage: patient with low platelets)  INTERVENTIONS:  - Avoid intramuscular injections, enemas and rectal medication administration  - Ensure safe mobilization of patient  - Hold pressure on venipuncture sites to achieve adequate hemostasis  - Assess for signs and symptoms of internal bleeding  - Monitor lab trends  - Patient is to report abnormal signs of bleeding to staff  - Avoid use of toothpicks and dental floss  - Use electric shaver for shaving  - Use soft bristle tooth brush  - Limit straining and forceful nose blowing  Outcome: Adequate for Discharge     Problem: MUSCULOSKELETAL - ADULT  Goal: Return mobility to safest level of function  Description: INTERVENTIONS:  - Assess patient stability and activity tolerance for standing, transferring and ambulating w/ or w/o assistive devices  - Assist with transfers and ambulation using safe patient handling equipment as needed  - Ensure  adequate protection for wounds/incisions during mobilization  - Obtain PT/OT consults as needed  - Advance activity as appropriate  - Communicate ordered activity level and limitations with patient/family  Outcome: Adequate for Discharge  Goal: Maintain proper alignment of affected body part  Description: INTERVENTIONS:  - Support and protect limb and body alignment per provider's orders  - Instruct and reinforce with patient and family use of appropriate assistive device and precautions (e.g. spinal or hip dislocation precautions)  Outcome: Adequate for Discharge   Patient post op day 4. Surgical incisions dry clean intact. Ileal conduit intact draining clear yellow urine. Left TOM  drain in place patient is being discharge with drain home, daughter familiar with TOM drain care. Lovenox injection this evening administered by patient's daughter, all questions answered.   Patient has been ambulating in halls, tolerated activity well. Written discharge instructions discussed with patient and family.

## 2024-07-14 NOTE — DISCHARGE SUMMARY
General Medicine Discharge Summary     Patient ID:  Javikuntlinda Ibarra  74 year old  2/23/1950    Admit date: 7/8/2024    Discharge date and time: 7/12/2024  7:00 PM     Attending Physician: Lamberto Zuluaga DO     Consults: CONSULT TO WOUND OSTOMY  IP CONSULT TO CASE MANAGEMENT  NURSING CONSULT TO DIETITIAN    Primary Care Physician: SOURAV FARR     Reason for admission: Observation after elective urologic procedure for bladder cancer    Risk For Readmission: low     Discharge Diagnoses: Bladder cancer  Bladder cancer (HCC)  See Additional Discharge Diagnoses in Hospital Course    Discharged Condition: good    Follow-up with labs/images appointments:   Close follow-up with primary care provider was recommended  Close follow-up with urology within 2 weeks was recommended        HPI:   Per Dr. EVERETT      Patient is a 74 year old female with PMH sig for DM, and bladder CA who presented to the hospital for radical cystectomy, ileal conduit urinary diversion. Patient was seen and examined post operatively in PACU. She was still extremely groggy from anesthesia and was only starting to wake up. Daughter at bedside. Vitals currently stable.     Hospital Course:   Patient was admitted for observation after elective urologic procedure for bladder cancer with resultant ileal conduit diversion did well after procedure seen by urology ultimately cleared for home.   Patient was given pain medicine and Lovenox to prevent DVT at the direction of urology.     Operative Procedures: Procedure(s) (LRB):  XI robotic assisted laparoscopic radical cystectomy, ileal conduit urinary diversion, bilateral pelvic lymph node dissection (N/A)     Imaging: No results found.    Disposition: home    Activity: activity as tolerated  Diet: regular diet  Wound Care: keep wound clean and dry  Code Status: No Order  O2: none    Home Medication Changes: see list below     Med list     Medication List        START taking these medications       docusate sodium 100 MG Caps  Commonly known as: COLACE  Take 100 mg by mouth 2 (two) times daily.     enoxaparin 40 MG/0.4ML Sosy  Commonly known as: Lovenox  Inject 0.4 mL (40 mg total) into the skin daily for 21 days.     traMADol 50 MG Tabs  Commonly known as: Ultram  Take 1 tablet (50 mg total) by mouth every 6 (six) hours as needed.            CONTINUE taking these medications      gabapentin 300 MG Caps  Commonly known as: Neurontin     glipiZIDE 10 MG Tabs  Commonly known as: Glucotrol     HYDROcodone-acetaminophen 5-325 MG Tabs  Commonly known as: Norco     Januvia 100 MG Tabs  Generic drug: SITagliptin Phosphate     metFORMIN 850 MG Tabs  Commonly known as: Glucophage     * REFRESH DRY EYE THERAPY OP     * REFRESH DRY EYE THERAPY OP           * This list has 2 medication(s) that are the same as other medications prescribed for you. Read the directions carefully, and ask your doctor or other care provider to review them with you.                   Where to Get Your Medications        These medications were sent to [facility] [address] [phone], [phone]  [address] [facility] [address]      Phone: [phone]   docusate sodium 100 MG Caps  enoxaparin 40 MG/0.4ML Sosy  traMADol 50 MG Tabs         FU   Follow-up Information       Celia Jimenez Follow up in 2 week(s).    Specialty: Family Medicine  Contact information:  [address]  [facility] [address] [phone]               Lenin Ibarra MD Follow up in 2 week(s).    Specialty: UROLOGY  Contact information:  [address] [facility] [address] [facility]  [address]  [facility] [address] [phone]                             DC instructions:      Other Discharge Instructions:         Sometimes managing your health at home requires assistance.  The [facility] team has recognized your preference to use [facility].  They can be reached by phone at [phone].  The fax  number for your reference is [phone].  A representative from the home health agency will contact you or your family to schedule your first visit.           I reconciled current and discharge medications on day of discharge, discussed changes with patient and noted changes above.       Total Time Coordinating Care: 35 minutes    Patient had opportunity to ask questions and state understand and agree with therapeutic plan as outlined    Thank You,    Lamberto Zuluaga, DO   Hospitalist with [facility] and [facility]

## 2024-07-15 ENCOUNTER — PATIENT OUTREACH (OUTPATIENT)
Dept: CASE MANAGEMENT | Age: 74
End: 2024-07-15

## 2024-07-15 NOTE — PROGRESS NOTES
1st attempt [facility] apt request    URO -existing apt (7/18)  DM/PCP -decline, pt dtr stated she has already been in contact w/ pcp and doesn't need to schedule at this time  Closing encounter

## 2025-05-28 ENCOUNTER — HOSPITAL ENCOUNTER (INPATIENT)
Facility: HOSPITAL | Age: 75
LOS: 5 days | Discharge: HOME HEALTH | End: 2025-06-02
Attending: INTERNAL MEDICINE | Admitting: INTERNAL MEDICINE
Payer: MEDICARE

## 2025-05-28 ENCOUNTER — HOSPITAL ENCOUNTER (INPATIENT)
Facility: HOSPITAL | Age: 75
LOS: 5 days | Discharge: HOME | End: 2025-06-02
Attending: INTERNAL MEDICINE | Admitting: STUDENT IN AN ORGANIZED HEALTH CARE EDUCATION/TRAINING PROGRAM
Payer: MEDICARE

## 2025-05-28 PROBLEM — K35.80 ACUTE APPENDICITIS: Status: ACTIVE | Noted: 2025-05-28

## 2025-05-28 RX ORDER — NICOTINE POLACRILEX 4 MG
30 LOZENGE BUCCAL
Status: DISCONTINUED | OUTPATIENT
Start: 2025-05-28 | End: 2025-06-02

## 2025-05-28 RX ORDER — SODIUM CHLORIDE, SODIUM LACTATE, POTASSIUM CHLORIDE, CALCIUM CHLORIDE 600; 310; 30; 20 MG/100ML; MG/100ML; MG/100ML; MG/100ML
INJECTION, SOLUTION INTRAVENOUS CONTINUOUS
Status: DISCONTINUED | OUTPATIENT
Start: 2025-05-28 | End: 2025-05-30

## 2025-05-28 RX ORDER — MORPHINE SULFATE 2 MG/ML
2 INJECTION, SOLUTION INTRAMUSCULAR; INTRAVENOUS EVERY 2 HOUR PRN
Status: DISCONTINUED | OUTPATIENT
Start: 2025-05-28 | End: 2025-06-02

## 2025-05-28 RX ORDER — MORPHINE SULFATE 4 MG/ML
4 INJECTION, SOLUTION INTRAMUSCULAR; INTRAVENOUS EVERY 2 HOUR PRN
Status: DISCONTINUED | OUTPATIENT
Start: 2025-05-28 | End: 2025-06-02

## 2025-05-28 RX ORDER — ACETAMINOPHEN 325 MG/1
650 TABLET ORAL EVERY 4 HOURS PRN
Status: DISCONTINUED | OUTPATIENT
Start: 2025-05-28 | End: 2025-06-02

## 2025-05-28 RX ORDER — FERROUS SULFATE 324(65)MG
324 TABLET, DELAYED RELEASE (ENTERIC COATED) ORAL 2 TIMES DAILY
COMMUNITY

## 2025-05-28 RX ORDER — AMLODIPINE BESYLATE 5 MG/1
5 TABLET ORAL DAILY
COMMUNITY

## 2025-05-28 RX ORDER — SENNOSIDES 8.6 MG
17.2 TABLET ORAL NIGHTLY PRN
Status: DISCONTINUED | OUTPATIENT
Start: 2025-05-28 | End: 2025-06-02

## 2025-05-28 RX ORDER — HYDROCODONE BITARTRATE AND ACETAMINOPHEN 5; 325 MG/1; MG/1
1 TABLET ORAL EVERY 4 HOURS PRN
Refills: 0 | Status: DISCONTINUED | OUTPATIENT
Start: 2025-05-28 | End: 2025-06-02

## 2025-05-28 RX ORDER — BISACODYL 10 MG
10 SUPPOSITORY, RECTAL RECTAL
Status: DISCONTINUED | OUTPATIENT
Start: 2025-05-28 | End: 2025-06-02

## 2025-05-28 RX ORDER — HYDROCODONE BITARTRATE AND ACETAMINOPHEN 5; 325 MG/1; MG/1
2 TABLET ORAL EVERY 4 HOURS PRN
Refills: 0 | Status: DISCONTINUED | OUTPATIENT
Start: 2025-05-28 | End: 2025-06-02

## 2025-05-28 RX ORDER — SODIUM PHOSPHATE, DIBASIC AND SODIUM PHOSPHATE, MONOBASIC 7; 19 G/230ML; G/230ML
1 ENEMA RECTAL ONCE AS NEEDED
Status: DISCONTINUED | OUTPATIENT
Start: 2025-05-28 | End: 2025-06-02

## 2025-05-28 RX ORDER — POLYETHYLENE GLYCOL 3350 17 G/17G
17 POWDER, FOR SOLUTION ORAL DAILY PRN
Status: DISCONTINUED | OUTPATIENT
Start: 2025-05-28 | End: 2025-06-02

## 2025-05-28 RX ORDER — METOCLOPRAMIDE HYDROCHLORIDE 5 MG/ML
10 INJECTION INTRAMUSCULAR; INTRAVENOUS EVERY 8 HOURS PRN
Status: DISCONTINUED | OUTPATIENT
Start: 2025-05-28 | End: 2025-06-02

## 2025-05-28 RX ORDER — MORPHINE SULFATE 2 MG/ML
1 INJECTION, SOLUTION INTRAMUSCULAR; INTRAVENOUS EVERY 2 HOUR PRN
Status: DISCONTINUED | OUTPATIENT
Start: 2025-05-28 | End: 2025-06-02

## 2025-05-28 RX ORDER — ONDANSETRON 2 MG/ML
4 INJECTION INTRAMUSCULAR; INTRAVENOUS EVERY 6 HOURS PRN
Status: DISCONTINUED | OUTPATIENT
Start: 2025-05-28 | End: 2025-06-02

## 2025-05-28 RX ORDER — DEXTROSE MONOHYDRATE 25 G/50ML
50 INJECTION, SOLUTION INTRAVENOUS
Status: DISCONTINUED | OUTPATIENT
Start: 2025-05-28 | End: 2025-06-02

## 2025-05-28 RX ORDER — NICOTINE POLACRILEX 4 MG
15 LOZENGE BUCCAL
Status: DISCONTINUED | OUTPATIENT
Start: 2025-05-28 | End: 2025-06-02

## 2025-05-28 NOTE — TRANSFER CENTER NOTE
DIRECT ADMISSION    Admitting MD: Dr. Hamilton  Called in by: [facility] ANASTACIA lomas  Call back #: RN station of pt [place] [address] at [facility] #[phone]  Date of admission: 5/28/2025  Diagnosis: +appendicitis, abdominal pain, c/b ileal conduit urostomy   Specialist MD:  Dr. Barnes  Hospitalist assigned: Dr. Hamilton  Type of admission: INPT  Type of bed: Surgical  Time of admission: 1930  Insurance Verification complete: Yes

## 2025-05-29 PROBLEM — N17.9 AKI (ACUTE KIDNEY INJURY): Status: ACTIVE | Noted: 2025-05-29

## 2025-05-29 PROBLEM — N18.4 CKD (CHRONIC KIDNEY DISEASE) STAGE 4, GFR 15-29 ML/MIN (HCC): Status: ACTIVE | Noted: 2025-05-29

## 2025-05-29 PROBLEM — R10.31 RIGHT LOWER QUADRANT ABDOMINAL PAIN: Status: ACTIVE | Noted: 2025-05-29

## 2025-05-29 LAB
ALBUMIN SERPL-MCNC: 3.8 G/DL (ref 3.2–4.8)
ALBUMIN/GLOB SERPL: 1.3 {RATIO} (ref 1–2)
ALP LIVER SERPL-CCNC: 84 U/L (ref 55–142)
ALT SERPL-CCNC: <7 U/L (ref 10–49)
ANION GAP SERPL CALC-SCNC: 10 MMOL/L (ref 0–18)
AST SERPL-CCNC: 9 U/L (ref ?–34)
BASOPHILS # BLD AUTO: 0.03 X10(3) UL (ref 0–0.2)
BASOPHILS NFR BLD AUTO: 0.4 %
BILIRUB SERPL-MCNC: 0.3 MG/DL (ref 0.2–1.1)
BUN BLD-MCNC: 41 MG/DL (ref 9–23)
BUN/CREAT SERPL: 15.5 (ref 10–20)
CALCIUM BLD-MCNC: 8.8 MG/DL (ref 8.7–10.4)
CHLORIDE SERPL-SCNC: 110 MMOL/L (ref 98–112)
CO2 SERPL-SCNC: 19 MMOL/L (ref 21–32)
CREAT BLD-MCNC: 2.65 MG/DL (ref 0.55–1.02)
DEPRECATED RDW RBC AUTO: 45.4 FL (ref 35.1–46.3)
EGFRCR SERPLBLD CKD-EPI 2021: 18 ML/MIN/1.73M2 (ref 60–?)
EOSINOPHIL # BLD AUTO: 0.2 X10(3) UL (ref 0–0.7)
EOSINOPHIL NFR BLD AUTO: 2.6 %
ERYTHROCYTE [DISTWIDTH] IN BLOOD BY AUTOMATED COUNT: 12.7 % (ref 11–15)
GLOBULIN PLAS-MCNC: 2.9 G/DL (ref 2–3.5)
GLUCOSE BLD-MCNC: 148 MG/DL (ref 70–99)
GLUCOSE BLDC GLUCOMTR-MCNC: 125 MG/DL (ref 70–99)
GLUCOSE BLDC GLUCOMTR-MCNC: 149 MG/DL (ref 70–99)
GLUCOSE BLDC GLUCOMTR-MCNC: 150 MG/DL (ref 70–99)
GLUCOSE BLDC GLUCOMTR-MCNC: 184 MG/DL (ref 70–99)
HCT VFR BLD AUTO: 29.6 % (ref 35–48)
HGB BLD-MCNC: 9.6 G/DL (ref 12–16)
IMM GRANULOCYTES # BLD AUTO: 0.04 X10(3) UL (ref 0–1)
IMM GRANULOCYTES NFR BLD: 0.5 %
LYMPHOCYTES # BLD AUTO: 0.73 X10(3) UL (ref 1–4)
LYMPHOCYTES NFR BLD AUTO: 9.3 %
MAGNESIUM SERPL-MCNC: 2.1 MG/DL (ref 1.6–2.6)
MCH RBC QN AUTO: 31.6 PG (ref 26–34)
MCHC RBC AUTO-ENTMCNC: 32.4 G/DL (ref 31–37)
MCV RBC AUTO: 97.4 FL (ref 80–100)
MONOCYTES # BLD AUTO: 0.6 X10(3) UL (ref 0.1–1)
MONOCYTES NFR BLD AUTO: 7.7 %
NEUTROPHILS # BLD AUTO: 6.24 X10 (3) UL (ref 1.5–7.7)
NEUTROPHILS # BLD AUTO: 6.24 X10(3) UL (ref 1.5–7.7)
NEUTROPHILS NFR BLD AUTO: 79.5 %
OSMOLALITY SERPL CALC.SUM OF ELEC: 301 MOSM/KG (ref 275–295)
PLATELET # BLD AUTO: 180 10(3)UL (ref 150–450)
POTASSIUM SERPL-SCNC: 4.6 MMOL/L (ref 3.5–5.1)
PROT SERPL-MCNC: 6.7 G/DL (ref 5.7–8.2)
RBC # BLD AUTO: 3.04 X10(6)UL (ref 3.8–5.3)
SODIUM SERPL-SCNC: 139 MMOL/L (ref 136–145)
WBC # BLD AUTO: 7.8 X10(3) UL (ref 4–11)

## 2025-05-29 PROCEDURE — 99223 1ST HOSP IP/OBS HIGH 75: CPT | Performed by: INTERNAL MEDICINE

## 2025-05-29 PROCEDURE — 99233 SBSQ HOSP IP/OBS HIGH 50: CPT | Performed by: STUDENT IN AN ORGANIZED HEALTH CARE EDUCATION/TRAINING PROGRAM

## 2025-05-29 RX ORDER — AMLODIPINE BESYLATE 5 MG/1
5 TABLET ORAL DAILY
Status: DISCONTINUED | OUTPATIENT
Start: 2025-05-29 | End: 2025-06-02

## 2025-05-29 RX ORDER — GABAPENTIN 300 MG/1
300 CAPSULE ORAL NIGHTLY
Status: DISCONTINUED | OUTPATIENT
Start: 2025-05-29 | End: 2025-06-02

## 2025-05-29 RX ORDER — FERROUS SULFATE 325(65) MG
325 TABLET, DELAYED RELEASE (ENTERIC COATED) ORAL 2 TIMES DAILY
Status: DISCONTINUED | OUTPATIENT
Start: 2025-05-29 | End: 2025-06-02

## 2025-05-29 NOTE — CONSULTS
[facility]  part of [facility]    Consultation    Michael Ibarra Patient Status:  Inpatient    1950 MRN [id number]   Location [facility] 4W/SW/SE Attending Jeffy Ibarra MD   Hosp Day # 1 PCP SOURAV FARR     History of Present Illness:  Michael Ibarra is a(n) 75 year old female. She has had a cystectomy in . She has new onset RLQ discomfort. She has no issues w her conduit.    History:  Past Medical History[1]  Past Surgical History[2]  Family History[3]   reports that she has never smoked. She has never used smokeless tobacco. She reports that she does not drink alcohol and does not use drugs.    Allergies:  Allergies[4]    Home Medications:  Prior to Admission Medications[5]    Review of Systems:  Pertinent items are noted in HPI.    Physical Exam:  /57 (BP Location: Left arm)   Pulse 81   Temp 98 °F (36.7 °C) (Oral)   Resp 18   Ht 5' 5\" (1.651 m)   Wt 128 lb 15.5 oz (58.5 kg)   SpO2 93%   BMI 21.46 kg/m²     General Appearance: Alert, cooperative, no distress, appears stated age  Head: Normocephalic, without obvious abnormality, atraumatic  Lungs: Clear to auscultation bilaterally, respirations unlabored  Abdomen: Soft, non-tender, bowel sounds active all four quadrants, no masses,  no organomegaly, ileal conduit-clear urine-non-acute ABD    Laboratory Data:   Lab Results   Component Value Date    WBC 7.8 2025    HGB 9.6 2025    HCT 29.6 2025    .0 2025    CREATSERUM 2.65 2025    BUN 41 2025     2025    K 4.6 2025     2025    CO2 19.0 2025     2025    CA 8.8 2025    ALB 3.8 2025    ALKPHO 84 2025    BILT 0.3 2025    TP 6.7 2025    AST 9 2025    ALT <7 2025    MG 2.1 2025    PGLU 125 2025          Imaging:  CT: Inflammatory changes in the right lower quadrant with findings  raising concern for acute appendicitis. No abscess. 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  [4] No Known Allergies  [5]   No current outpatient medications on file.

## 2025-05-29 NOTE — CONSULTS
[facility]  Report of Consultation  Is this a shared or split note between Advanced Practice Provider and Physician? Yes   Michael Ibarra Patient Status:  Inpatient    1950 MRN [id number]   Location [facility] 4W/SW/SE Attending Jeffy Ibarra MD   Hosp Day # 1 PCP SOURAV FARR     Requesting Physician:  Dr. Jeffy Ibarra    Reason for Consultation:  Possible acute appendicitis     Chief Complaint:  Abdominal pain    History of Present Illness:  Patient is Gujarati speaking, combination of language line and daughter used for translation.  Michael Ibarra is a 75 year old female with PMH of SCC of bladder s/p radical cystectomy and formation of ileal conduit urinary diversion 2024.    She presents to [facility] on 2025 with complaints of RLQ abdominal pain for the last 2 days.  She reports she has not experienced this pain before.  The pain is severe and constant.  She denies associated nausea or vomiting.  She denies fever or chills.  She denies diarrhea or constipation.  Her last bowel movement was 2 days ago.  She denies chest pain, SOB.  She does not note changes in output from ileal conduit.    Patient initially admitted to [place] through the hospital.  Workup revealed no leukocytosis, UA revealed UTI.  She was placed on IV antibiotics.  CTAP with findings of inflammatory changes and RLQ with findings raising concern for acute appendicitis without abscess.  The scan is unavailable to review currently.  General surgery was consulted, surgery was not recommending surgery at the time due to high suspicion of inflammatory changes from UTI and recent surgery.  Patient was transferred to [place] due to urologist practicing at this hospital.    Upon presentation to the hospital, patient afebrile, hemodynamically stable.   Patient says her pain is managed with analgesics, still present.  Hemoglobin 9.6.  BUN 41, creatinine  2.65.  She is not currently passing gas    Past medical history as above mention, HLD, DM, CKD, multiple myeloma    Past abdominal surgical history as above mentioned.      History:  Past Medical History[1]  Past Surgical History[2]  Family History[3]   reports that she has never smoked. She has never used smokeless tobacco. She reports that she does not drink alcohol and does not use drugs.    Allergies:  Allergies[4]    Medications:  Prescriptions Prior to Admission[5]    Current Hospital Medications[6]    Review of Systems:  Review of Systems   Constitutional:  Negative for chills, fatigue and fever.   Respiratory:  Negative for shortness of breath and wheezing.    Cardiovascular:  Negative for chest pain and leg swelling.   Gastrointestinal:  Positive for abdominal pain. Negative for nausea, vomiting, diarrhea and constipation.   Genitourinary:  Negative for dysuria, hematuria and flank pain.   Neurological:  Negative for dizziness and weakness.       Physical Exam:  /57 (BP Location: Left arm)   Pulse 81   Temp 98 °F (36.7 °C) (Oral)   Resp 18   Ht 5' 5\" (1.651 m)   Wt 128 lb 15.5 oz (58.5 kg)   SpO2 93%   BMI 21.46 kg/m²   Physical Exam  Constitutional:       General: She is not in acute distress.     Appearance: Normal appearance. She is not ill-appearing or toxic-appearing.   HENT:      Mouth/Throat:      Mouth: Mucous membranes are moist.      Pharynx: Oropharynx is clear.   Eyes:      Conjunctiva/sclera: Conjunctivae normal.   Cardiovascular:      Rate and Rhythm: Normal rate and regular rhythm.      Pulses: Normal pulses.   Pulmonary:      Effort: Pulmonary effort is normal.      Breath sounds: Normal breath sounds.   Abdominal:      General: Abdomen is flat. There is no distension.      Palpations: Abdomen is soft.      Tenderness: There is abdominal tenderness in the right lower quadrant. There is no guarding or rebound.      Comments: Ileal conduit with ostomy appliance and clear yellow  output   Musculoskeletal:         General: No swelling.   Skin:     General: Skin is warm and dry.   Neurological:      General: No focal deficit present.      Mental Status: She is alert and oriented to person, place, and time.   Psychiatric:         Mood and Affect: Mood normal.         Behavior: Behavior normal.         Laboratory Data:  Recent Labs   Lab 05/29/25  0825   RBC 3.04*   HGB 9.6*   HCT 29.6*   MCV 97.4   MCH 31.6   MCHC 32.4   RDW 12.7   NEPRELIM 6.24   WBC 7.8   .0       Recent Labs   Lab 05/29/25  0825   *   BUN 41*   CREATSERUM 2.65*   CA 8.8   ALB 3.8      K 4.6      CO2 19.0*   ALKPHO 84   AST 9   ALT <7*   BILT 0.3   TP 6.7         No results for input(s): \"PTP\", \"INR\", \"PTT\" in the last 168 hours.      No results found.              Medical Decision Making         Impression:  Problem List[7]    RLQ abdominal pain, possible appendicitis vs inflammatory changes from UTI/recent surgery  UTI per [facility] from [facility]  HX squamous cell carcinoma of bladder s/p robotic cystectomy 07/2024 with ileal conduit    Plan:  Patient discussed with Dr. Barnes. No acute surgical intervention planned at this time. Awaiting CTAP scan from [facility] to become available. Recommends urology consultation for further management recommendations.   Keep NPO  Continue IV fluids and abx  Analgesics and antiemetics as needed  Dr. Barnes to evaluate and provide further surgical recommendations.     Thank you for allowing us to participate in the care of the patient.   NELL Ladd  5/29/2025  12:17 PM            The patient was independently examined. Agree with the PA's assessment and plan. 75 year old female with a history of SCC of the bladder s/p cystectomy and ileal conduit 7/2024 presenting with 2 days of RLQ abdominal pain. CT abd/pel radiology read from OSH comments on inflammation around ileal conduit with question of appendicitis without abscess. WBC wnl. No nausea/vomiting or changes in bowel habits.  She has ongoing pain around the ileal conduit. Will review CT once uploaded to our system. No immediate plan for surgery from our standpoint. In the absence of appendicolith, conservative tx with antibiotics for uncomplicated appendicitis may be reasonable for this patient in light of her complicated surgical/medical history. Appreciate urology input.     CT in PACS reviewed - inflammatory changes surrounding the ileal conduit and not localized to the appendix, which appears unruptured and minimally dilated appendix. Will start clear liquids      More than 50% of clinical time and 100% MDM was performed by me.     Clarisa Barnes MD  [facility] - General Surgery   [address]  p [phone]                 [1]   Past Medical History:   Back problem    Cancer (HCC)    bladder    Cataract    Diabetes (HCC)    Osteoarthritis   [2]   Past Surgical History:  Procedure Laterality Date    Cataract Bilateral    [3] No family history on file.  [4] No Known Allergies  [5]   Medications Prior to Admission   Medication Sig    amLODIPine 5 MG Oral Tab Take 1 tablet (5 mg total) by mouth daily.    Ferrous Sulfate 324 (65 Fe) MG Oral Tab EC Take 324 mg by mouth in the morning and 324 mg before bedtime.    Glycerin-Polysorbate 80 (REFRESH DRY EYE THERAPY OP) Apply to eye.    Glycerin-Polysorbate 80 (REFRESH DRY EYE THERAPY OP) Apply to eye.    glipiZIDE 10 MG Oral Tab Take 1 tablet (10 mg total) by mouth in the morning and 1 tablet (10 mg total) in the evening. Take before meals.    HYDROcodone-acetaminophen 5-325 MG Oral Tab Take 1 tablet by mouth every 6 (six) hours as needed.    gabapentin 300 MG Oral Cap Take 1 capsule (300 mg total) by mouth nightly.    docusate sodium 100 MG Oral Cap Take 100 mg by mouth 2 (two) times daily.    traMADol 50 MG Oral Tab Take 1 tablet (50 mg total) by mouth every 6 (six) hours as needed.    metFORMIN 850 MG Oral Tab Take 1 tablet (850 mg total) by  mouth in the morning, at noon, and at bedtime.    SITagliptin Phosphate (JANUVIA) 100 MG Oral Tab Take 1 tablet (100 mg total) by mouth daily.   [6]   Current Facility-Administered Medications:     amLODIPine (Norvasc) tab 5 mg, 5 mg, Oral, Daily    ferrous sulfate DR tab 325 mg, 325 mg, Oral, BID    gabapentin (Neurontin) cap 300 mg, 300 mg, Oral, Nightly    glucose (Dex4) 15 GM/59ML oral liquid 15 g, 15 g, Oral, Q15 Min PRN **OR** glucose (Glutose) 40% oral gel 15 g, 15 g, Oral, Q15 Min PRN **OR** glucose-vitamin C (Dex-4) chewable tab 4 tablet, 4 tablet, Oral, Q15 Min PRN **OR** dextrose 50% injection 50 mL, 50 mL, Intravenous, Q15 Min PRN **OR** glucose (Dex4) 15 GM/59ML oral liquid 30 g, 30 g, Oral, Q15 Min PRN **OR** glucose (Glutose) 40% oral gel 30 g, 30 g, Oral, Q15 Min PRN **OR** glucose-vitamin C (Dex-4) chewable tab 8 tablet, 8 tablet, Oral, Q15 Min PRN    insulin aspart (NovoLOG) 100 Units/mL FlexPen 1-7 Units, 1-7 Units, Subcutaneous, TID CC    lactated ringers infusion, , Intravenous, Continuous    acetaminophen (Tylenol) tab 650 mg, 650 mg, Oral, Q4H PRN **OR** HYDROcodone-acetaminophen (Norco) 5-325 MG per tab 1 tablet, 1 tablet, Oral, Q4H PRN **OR** HYDROcodone-acetaminophen (Norco) 5-325 MG per tab 2 tablet, 2 tablet, Oral, Q4H PRN    morphINE PF 2 MG/ML injection 1 mg, 1 mg, Intravenous, Q2H PRN **OR** morphINE PF 2 MG/ML injection 2 mg, 2 mg, Intravenous, Q2H PRN **OR** morphINE PF 4 MG/ML injection 4 mg, 4 mg, Intravenous, Q2H PRN    polyethylene glycol (PEG 3350) (Miralax) 17 g oral packet 17 g, 17 g, Oral, Daily PRN    sennosides (Senokot) tab 17.2 mg, 17.2 mg, Oral, Nightly PRN    bisacodyl (Dulcolax) 10 MG rectal suppository 10 mg, 10 mg, Rectal, Daily PRN    fleet enema (Fleet) rectal enema 133 mL, 1 enema, Rectal, Once PRN    ondansetron (Zofran) 4 MG/2ML injection 4 mg, 4 mg, Intravenous, Q6H PRN    metoclopramide (Reglan) 5 mg/mL injection 10 mg, 10 mg, Intravenous, Q8H PRN  [7]    Patient Active Problem List  Diagnosis    Ostomy nurse consultation    Bladder cancer (HCC)    Acute appendicitis    JUAN MANUEL (acute kidney injury)    CKD (chronic kidney disease) stage 4, GFR 15-29 ml/min ([facility])

## 2025-05-29 NOTE — PROGRESS NOTES
[facility]  part of [facility] Infectious Disease  Report of Consultation    Michael Ibarra Patient Status:  Inpatient    1950 MRN [id number]   Location [facility] 4W/SW/SE Attending Jeffy Ibarra MD   Hosp Day # 1 PCP SOURAV FARR     Date of Admission:  2025  Date of Consult:  2025    Reason for Consultation:  UTI, appendicitis    History of Present Illness:  Michael Ibarra is a a(n) 75 year old female being seen at your request regarding appendicitis complicated by her ileal conduit/urostomy.  Patient has a h/o bladder cancer and chronic hydronephrosis with ileal conduit.  Urine cultures historically with ESBL isolates and she was recently started on IV meropenem as an outpatient. 25.  She presented to immediate care with RLQ abdominal pain.  She was referred to the ED for further w/u and treatment and was admitted at [facility].  Patient now transferred to [facility] for a higher level of care.    Urine cultures from [facility] reviewed.  Historically with ESBL e.coli and lactobacillus on cultures so she was started on IV meropenem.  CT of the abdomen and pelvis ordered with inflammatory changes concerning for acute appendicitis.  There is also recurrent b/l hydros (patient has had neph tube in the past.)    Patient was continued on IV meropenem.  Presently NPO without immediate plans for surgery.  We are asked to see and assist.    History:  Past Medical History[1]  Past Surgical History[2]  Family History[3]   reports that she has never smoked. She has never used smokeless tobacco. She reports that she does not drink alcohol and does not use drugs.    Allergies:  Allergies[4]    Medications:  Current Hospital Medications[5]    Review of Systems:    Constitutional:  No fevers, chills, diaphoresis, weight changes.   HEENT:  No visual changes, oral ulcers, sore throat, difficulty swallowing.   Respiratory: Negative  for cough, sputum, hemoptysis, chest pain, wheezing, dyspnea on exertion, or stridor.   Cardiovascular: Negative for chest pain, palpitations, irregular heart beats.   Gastrointestinal:  RLQ abdominal pain.   Genitourinary:  Urostomy/ileal conduit.   Integument/breast: Negative for rash, skin lesions, and pruritus.   Hematologic/lymphatic: Negative for easy bruising, bleeding, and lymphadenopathy.   Musculoskeletal: Negative for myalgias, arthralgias, muscle weakness.   Neurological: No focal neurologic deficits, seizures, tremors.   Psych:  No h/o anxiety, depression, other psych d/o.   Endocrine: No history of of diabetes, thyroid disorder.    Remainder of 12 point review of systems otherwise negative.    Vital signs in last 24 hours:  Patient Vitals for the past 24 hrs:   BP Temp Temp src Pulse Resp SpO2 Height Weight   05/29/25 1053 111/57 98 °F (36.7 °C) Oral 81 18 93 % -- --   05/29/25 0903 116/58 99 °F (37.2 °C) Oral 79 18 95 % -- --   05/29/25 0519 102/64 97.7 °F (36.5 °C) Temporal 76 18 95 % -- --   05/28/25 2037 110/66 97.7 °F (36.5 °C) Oral 88 20 97 % 5' 5\" (1.651 m) 128 lb 15.5 oz (58.5 kg)       Intake/Output:  I/O this shift:  In: -   Out: 450 [Urine:450]    Physical Exam:   General: Awake, alert, non-tox and in NAD.   Head: Normocephalic, without obvious abnormality, atraumatic.   Eyes: Conjunctivae/corneas clear.  No scleral icterus.  No conjunctival     hemorrhage.   Nose: Nares normal.   Throat:  Oropharynx clear, MMs moist.   Neck: Trachea ML, no masses.   Lungs: CTA b/l no rhonchi, rales, wheezes.   Chest wall: No tenderness or deformity.   Heart: Regular rate and rhythm, normal S1S2, no murmurs.   Abdomen: Soft, RLQ tenderness.  Urostomy.   Extremity: No edema.   Skin: No rashes or lesions.   Neurological: No focal neurologic deficits.    Lab Data Review:  Lab Results   Component Value Date    WBC 7.8 05/29/2025    HGB 9.6 05/29/2025    HCT 29.6 05/29/2025    .0 05/29/2025    CREATSERUM  2.65 05/29/2025    BUN 41 05/29/2025     05/29/2025    K 4.6 05/29/2025     05/29/2025    CO2 19.0 05/29/2025     05/29/2025    CA 8.8 05/29/2025    ALB 3.8 05/29/2025    ALKPHO 84 05/29/2025    BILT 0.3 05/29/2025    TP 6.7 05/29/2025    AST 9 05/29/2025    ALT <7 05/29/2025    MG 2.1 05/29/2025        Cultures:   Recent ESBL e.coli UTI as an outpatient    Radiology:  Impression    Inflammatory changes in the right lower quadrant with findings  raising concern for acute appendicitis. No abscess.    Recurrent bilateral hydroureteronephrosis with ileal loop diversion right  lower quadrant noted.    Assessment and Plan:    ESBL e.coli UTI in this woman with a h/o bladder cancer s/p urostomy  - Has had nephrostomy tubes in the past as well  - CT with recurrent b/l hydronephrosis  - IV meropenem started 5/29/25, will continue    2.  RLQ abdominal pain with CT evidence of appendicitis  - General surgery input reviewed, no immediate plans for OR    3.  Disposition - inpatient.  No F/C, leukocytosis, or gross s/s of sepsis.  Continue IV meropenem for both GI and  coverage.  Awaiting formal urology input.  Trending temps and WBCs.  Duration of IV antibiotics to be determined pending clinical course and any surgical/interventional needs.  Will follow.    Jeniffer Ramirez DO[facility]  [phone]    5/29/2025  12:57 PM         [1]   Past Medical History:   Back problem    Cancer (HCC)    bladder    Cataract    Diabetes (HCC)    Osteoarthritis   [2]   Past Surgical History:  Procedure Laterality Date    Cataract Bilateral    [3] No family history on file.  [4] No Known Allergies  [5]   Current Facility-Administered Medications:     amLODIPine (Norvasc) tab 5 mg, 5 mg, Oral, Daily    ferrous sulfate DR tab 325 mg, 325 mg, Oral, BID    gabapentin (Neurontin) cap 300 mg, 300 mg, Oral, Nightly    glucose (Dex4) 15 GM/59ML oral liquid 15 g, 15 g, Oral, Q15 Min PRN  **OR** glucose (Glutose) 40% oral gel 15 g, 15 g, Oral, Q15 Min PRN **OR** glucose-vitamin C (Dex-4) chewable tab 4 tablet, 4 tablet, Oral, Q15 Min PRN **OR** dextrose 50% injection 50 mL, 50 mL, Intravenous, Q15 Min PRN **OR** glucose (Dex4) 15 GM/59ML oral liquid 30 g, 30 g, Oral, Q15 Min PRN **OR** glucose (Glutose) 40% oral gel 30 g, 30 g, Oral, Q15 Min PRN **OR** glucose-vitamin C (Dex-4) chewable tab 8 tablet, 8 tablet, Oral, Q15 Min PRN    insulin aspart (NovoLOG) 100 Units/mL FlexPen 1-7 Units, 1-7 Units, Subcutaneous, TID CC    lactated ringers infusion, , Intravenous, Continuous    acetaminophen (Tylenol) tab 650 mg, 650 mg, Oral, Q4H PRN **OR** HYDROcodone-acetaminophen (Norco) 5-325 MG per tab 1 tablet, 1 tablet, Oral, Q4H PRN **OR** HYDROcodone-acetaminophen (Norco) 5-325 MG per tab 2 tablet, 2 tablet, Oral, Q4H PRN    morphINE PF 2 MG/ML injection 1 mg, 1 mg, Intravenous, Q2H PRN **OR** morphINE PF 2 MG/ML injection 2 mg, 2 mg, Intravenous, Q2H PRN **OR** morphINE PF 4 MG/ML injection 4 mg, 4 mg, Intravenous, Q2H PRN    polyethylene glycol (PEG 3350) (Miralax) 17 g oral packet 17 g, 17 g, Oral, Daily PRN    sennosides (Senokot) tab 17.2 mg, 17.2 mg, Oral, Nightly PRN    bisacodyl (Dulcolax) 10 MG rectal suppository 10 mg, 10 mg, Rectal, Daily PRN    fleet enema (Fleet) rectal enema 133 mL, 1 enema, Rectal, Once PRN    ondansetron (Zofran) 4 MG/2ML injection 4 mg, 4 mg, Intravenous, Q6H PRN    metoclopramide (Reglan) 5 mg/mL injection 10 mg, 10 mg, Intravenous, Q8H PRN

## 2025-05-29 NOTE — H&P
[facility] Hospitalist H&P       CC: No chief complaint on file.       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     Patient was seen at [facility] yesterday and transferred to [facility]   Patient initially presented with 7/10 RLQ pain for one day improved initially with acetaminophen   No reported change to output   Patient has history of nephrostomy that were removed 2 months ago   Currently not on chemotherapy or anticoagulation   Patient states pain began relatively suddenly but has improved somewhat with medicines   No symptoms or concerns last week when patient was seen last week     ED course:  In ED, VS w/o fever, tachycardia, tachypnea, hypoxia. Stable BP.   Morphine, Zofran fluid bolus for symptomatic management  CBC with normal WBC, chronic anemia 10.3 with normal platelet count  CMP with creatinine 2.72 which is similar to patient's baseline with reassuring lecture lites and LFTs.  Lipase negative.  UA w/ UTI. Urine culture from January 2025 positive for ESBL E. coli and lactobacillus species.  CT Abdomen Pelvis Without Contrast: Inflammatory changes in the right lower quadrant with findings raising concern for acute appendicitis. No abscess.Recurrent bilateral hydroureteronephrosis with ileal loop diversion right lower quadrant noted.      PMH  Past Medical History[1]     PSH  Past Surgical History[2]     ALL:  Allergies[3]     Home Medications:  Medications Taking[4]      Soc Hx  Social History     Tobacco Use    Smoking status: Never    Smokeless tobacco: Never   Substance Use Topics    Alcohol use: Never        Fam Hx  Family  History[5]    Review of Systems  Comprehensive ROS reviewed and negative except for what's stated above.     OBJECTIVE:  /64 (BP Location: Right arm)   Pulse 76   Temp 97.7 °F (36.5 °C) (Temporal)   Resp 18   Ht 5' 5\" (1.651 m)   Wt 128 lb 15.5 oz (58.5 kg)   SpO2 95%   BMI 21.46 kg/m²   Physical Exam  Vitals reviewed.   Constitutional:       General: She is not in acute distress.     Appearance: Normal appearance. She is ill-appearing. She is not toxic-appearing or diaphoretic.   Cardiovascular:      Rate and Rhythm: Normal rate and regular rhythm.      Pulses: Normal pulses.      Heart sounds: Normal heart sounds.   Pulmonary:      Effort: Pulmonary effort is normal.      Breath sounds: Normal breath sounds.   Abdominal:      Palpations: Abdomen is soft.      Tenderness: There is abdominal tenderness in the right lower quadrant, periumbilical area and suprapubic area.      Comments: Urostomy over mcburney's point, with tenderness predominantly inferior to stoma, but some near suprapubic and periumbilical spaces. Urostomy with adequate output   Skin:     General: Skin is warm and dry.      Capillary Refill: Capillary refill takes less than 2 seconds.   Neurological:      General: No focal deficit present.      Mental Status: She is alert and oriented to person, place, and time. Mental status is at baseline.   Psychiatric:         Mood and Affect: Mood normal.         Behavior: Behavior normal.          Diagnostic Data:    CBC/Chem  Recent Labs   Lab 05/29/25  0825   WBC 7.8   HGB 9.6*   MCV 97.4   .0       No results for input(s): \"NA\", \"K\", \"CL\", \"CO2\", \"BUN\", \"CREATSERUM\", \"GLU\", \"CA\", \"CAION\", \"MG\", \"PHOS\" in the last 168 hours.    No results for input(s): \"ALT\", \"AST\", \"ALB\", \"AMYLASE\", \"LIPASE\", \"LDH\" in the last 168 hours.    Invalid input(s): \"ALPHOS\", \"TBIL\", \"DBIL\", \"TPROT\"    No results for input(s): \"TROP\" in the last 168 hours.      Radiology: No results found.      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     RLQ pain, likely 2/2 appendicitis c/b UTI and ileal conduit urostomy   History of radical cystectomy, hysterectomy, and bilateral salpingo-oophorectomy due to squamous cell bladder CA   CT: Inflammatory changes in the right lower quadrant with findings raising concern for acute appendicitis. No abscess. Recurrent bilateral hydroureteronephrosis with ileal loop diversion right lower quadrant noted.    Plan:   Patient NPO, IVF   Acetaminophen, morphine for pain. Bowel regimen in place.   Consults: General Surgery, Urology, ID, Nephrology     UTI  History of ESBL UTI and hydroureteronephrosis   Leukocytosis   UA with positive nitrites and leuks, history of ESBL    Given Meropenem prior to transfer   CBC 5/28 without leukocytosis and hyperglycemia and with some normocytic anemia to 10.1 likely c/b multiple myeloma history  Remains afebrile  ID Consulted for further abx guidance     Possible JUAN MANUEL on CKD  Anemia, likely c/b multiple myeloma   CMP 5/28 with elevated Cr 2.58, slightly above baseline ~2.45. awaiting labs from 5/29.  Patient with no clear establishment with nephrology noted   On Iron PO therapy    Plan:   Consult Nephrology   Avoid nephrotoxic medications, titrate doses, when appropriate     Chronic problems:    HTN - Continue Amlodipine     HLD - Hold statin     NIDDM   - Hold PO Medications,   - Correctional Algorithm  - Gabapentin for neuropathy     FN:  - IVF:LR 75ml/hr   - Diet:NPO    DVT Prophy:Held  Lines:PIV     Dispo: pending clinical course    Outpatient records or previous hospital records reviewed.     Further recommendations pending patient's clinical course.   [facility] hospitalist to continue to follow patient while in house    Patient and/or patient's family given opportunity to ask questions and note understanding and agreeing with therapeutic plan as outlined    Thank You,  Jeffy Ibarra MD    Hospitalist with [facility]  Answering Service number: [phone]         [1]   Past Medical History:   Back problem    Cancer (HCC)    bladder    Cataract    Diabetes (HCC)    Osteoarthritis   [2]   Past Surgical History:  Procedure Laterality Date    Cataract Bilateral    [3] No Known Allergies  [4]   Outpatient Medications Marked as Taking for the 5/28/25 encounter (Hospital Encounter)   Medication Sig Dispense Refill    amLODIPine 5 MG Oral Tab Take 1 tablet (5 mg total) by mouth daily.      Ferrous Sulfate 324 (65 Fe) MG Oral Tab EC Take 324 mg by mouth in the morning and 324 mg before bedtime.      Glycerin-Polysorbate 80 (REFRESH DRY EYE THERAPY OP) Apply to eye.      Glycerin-Polysorbate 80 (REFRESH DRY EYE THERAPY OP) Apply to eye.      glipiZIDE 10 MG Oral Tab Take 1 tablet (10 mg total) by mouth in the morning and 1 tablet (10 mg total) in the evening. Take before meals.      HYDROcodone-acetaminophen 5-325 MG Oral Tab Take 1 tablet by mouth every 6 (six) hours as needed.      gabapentin 300 MG Oral Cap Take 1 capsule (300 mg total) by mouth nightly.     [5] No family history on file.

## 2025-05-29 NOTE — CONSULTS
[facility]  part of [facility]    Report of Consultation    Michael Ibarra Patient Status:  Inpatient    1950 MRN [id number]   Location [facility] 4W/SW/SE Attending Jeffy Ibarra MD   Hosp Day # 1 PCP SOURAV FARR     Date of Admission:  2025  Date of Consult:  2025   Reason for Consultation:   CKD    History of Present Illness:   Patient is a 75 year old female who was admitted to the hospital for <principal problem not specified>:     76 y/o F with h/o DM, CKD IV HLD,, smoldering MM and sq cell ca of bladder s/p cystectomy with ileal conduit and history of hydronephrosis requiring b/l pcn and no improvement in renal fx and eventual removal presented [facility] for right lower quad pain. Ct  suggesting appendicitis but bc of ileal conduit urostomy was transferred to [place] bc of her surgeon  Also found to have UTI and getting treated for it   Family at bedside    CT abd   Recurrent bilateral hydroureteronephrosis with ileal loop diversion right lower quadrant noted.       Past Medical History  Past Medical History[1]    Past Surgical History  Past Surgical History[2]    Family History  Family History[3]    Social History  Short Social Hx on File[4]    Current Medications:  Current Hospital Medications[5]  Prescriptions Prior to Admission[6]    Allergies  Allergies[7]    Review of Systems:   GENERAL HEALTH: feels well otherwise  SKIN: denies any unusual skin lesions or rashes  RESPIRATORY: denies shortness of breath with exertion, no cough, no hemoptysis  CARDIOVASCULAR: denies chest pain on exertion, no palpitations  :  Denies hematuria, dysuria, foamy urine  GI: denies abdominal pain and denies heartburn, no nausea/vomiting/diarrhea, RLQ pain   EXT: no edema  NEURO: denies headaches      A comprehensive 12 point review of systems was completed.  Pertinent positives as above and all the rest were negative.     Physical Exam:   /57  (BP Location: Left arm)   Pulse 81   Temp 98 °F (36.7 °C) (Oral)   Resp 18   Ht 5' 5\" (1.651 m)   Wt 128 lb 15.5 oz (58.5 kg)   SpO2 93%   BMI 21.46 kg/m²      Intake/Output Summary (Last 24 hours) at 5/29/2025 1144  Last data filed at 5/29/2025 1056  Gross per 24 hour   Intake --   Output 1350 ml   Net -1350 ml     Wt Readings from Last 1 Encounters:   05/28/25 128 lb 15.5 oz (58.5 kg)       Exam  GENERAL: well developed, well nourished,in no apparent distress  SKIN: no rashes  HEENT: no scleral icterus, moist mucus membranes  NECK: supple,no adenopathy,no bruits  LUNGS: clear to auscultation without crackles or wheezes  CARDIO: RRR without murmur  GI: RLQ TTP , ileal consuit   EXTREMITIES: no cyanosis, clubbing or edema  NEURO: CN grossly intact, A+O x3  Access      Results:     Laboratory Data:  Recent Labs   Lab 05/29/25  0825   RBC 3.04*   HGB 9.6*   HCT 29.6*   MCV 97.4   MCH 31.6   MCHC 32.4   RDW 12.7   NEPRELIM 6.24   WBC 7.8   .0         Recent Labs   Lab 05/29/25  0825   *   BUN 41*   CREATSERUM 2.65*   CA 8.8      K 4.6      CO2 19.0*        Imaging:  No results found.        Impression/Plan:     Impression:  JUAN MANUEL, mild, also with recurrent b/l hydro , urology to see and continue IVF   CKD IV- b/l cr 23-2.5 mg/dl . Thought sec to urinary reflux from ileal conduit. Follows with Dr Behara ([facility])   Acidosis sec to ileal conduit , on sodium bicarb  HTN- home meds  DM 2 ISS  H/o subcutaneous cell ca s/p cystectomy and ileal conduit July 2024   H/o b/l hydro and requiring b/l PCN and eventual removal   UTI , abx  Right lower quad pain, ct wih appendicitis. Surg eval  Sec hpth, monitor phosp, ipth        Plan:    Continue IVF  Avoid contrast  Labs in am    Thank you very much for allowing me to participate in the care of your patient.  If you have any questions, please do not hesitate to contact me.     Hina Lainez MD  5/29/2025         [1]   Past Medical History:   Back problem     Cancer (HCC)    bladder    Cataract    Diabetes (HCC)    Osteoarthritis   [2]   Past Surgical History:  Procedure Laterality Date    Cataract Bilateral    [3] No family history on file.  [4]   Social History  Socioeconomic History    Marital status:    Tobacco Use    Smoking status: Never    Smokeless tobacco: Never   Vaping Use    Vaping status: Never Used   Substance and Sexual Activity    Alcohol use: Never    Drug use: Never     Social Drivers of Health     Food Insecurity: No Food Insecurity (5/28/2025)    NCSS - Food Insecurity     Worried About Running Out of Food in the Last Year: No     Ran Out of Food in the Last Year: No   Transportation Needs: No Transportation Needs (5/28/2025)    NCSS - Transportation     Lack of Transportation: No   Housing Stability: Not At Risk (5/28/2025)    NCSS - Housing/Utilities     Has Housing: Yes     Worried About Losing Housing: No     Unable to Get Utilities: No   [5]   Current Facility-Administered Medications   Medication Dose Route Frequency    amLODIPine (Norvasc) tab 5 mg  5 mg Oral Daily    ferrous sulfate DR tab 325 mg  325 mg Oral BID    gabapentin (Neurontin) cap 300 mg  300 mg Oral Nightly    glucose (Dex4) 15 GM/59ML oral liquid 15 g  15 g Oral Q15 Min PRN    Or    glucose (Glutose) 40% oral gel 15 g  15 g Oral Q15 Min PRN    Or    glucose-vitamin C (Dex-4) chewable tab 4 tablet  4 tablet Oral Q15 Min PRN    Or    dextrose 50% injection 50 mL  50 mL Intravenous Q15 Min PRN    Or    glucose (Dex4) 15 GM/59ML oral liquid 30 g  30 g Oral Q15 Min PRN    Or    glucose (Glutose) 40% oral gel 30 g  30 g Oral Q15 Min PRN    Or    glucose-vitamin C (Dex-4) chewable tab 8 tablet  8 tablet Oral Q15 Min PRN    insulin aspart (NovoLOG) 100 Units/mL FlexPen 1-7 Units  1-7 Units Subcutaneous TID CC    lactated ringers infusion   Intravenous Continuous    acetaminophen (Tylenol) tab 650 mg  650 mg Oral Q4H PRN    Or    HYDROcodone-acetaminophen (Norco) 5-325 MG per  tab 1 tablet  1 tablet Oral Q4H PRN    Or    HYDROcodone-acetaminophen (Norco) 5-325 MG per tab 2 tablet  2 tablet Oral Q4H PRN    morphINE PF 2 MG/ML injection 1 mg  1 mg Intravenous Q2H PRN    Or    morphINE PF 2 MG/ML injection 2 mg  2 mg Intravenous Q2H PRN    Or    morphINE PF 4 MG/ML injection 4 mg  4 mg Intravenous Q2H PRN    polyethylene glycol (PEG 3350) (Miralax) 17 g oral packet 17 g  17 g Oral Daily PRN    sennosides (Senokot) tab 17.2 mg  17.2 mg Oral Nightly PRN    bisacodyl (Dulcolax) 10 MG rectal suppository 10 mg  10 mg Rectal Daily PRN    fleet enema (Fleet) rectal enema 133 mL  1 enema Rectal Once PRN    ondansetron (Zofran) 4 MG/2ML injection 4 mg  4 mg Intravenous Q6H PRN    metoclopramide (Reglan) 5 mg/mL injection 10 mg  10 mg Intravenous Q8H PRN   [6]   Medications Prior to Admission   Medication Sig    amLODIPine 5 MG Oral Tab Take 1 tablet (5 mg total) by mouth daily.    Ferrous Sulfate 324 (65 Fe) MG Oral Tab EC Take 324 mg by mouth in the morning and 324 mg before bedtime.    Glycerin-Polysorbate 80 (REFRESH DRY EYE THERAPY OP) Apply to eye.    Glycerin-Polysorbate 80 (REFRESH DRY EYE THERAPY OP) Apply to eye.    glipiZIDE 10 MG Oral Tab Take 1 tablet (10 mg total) by mouth in the morning and 1 tablet (10 mg total) in the evening. Take before meals.    HYDROcodone-acetaminophen 5-325 MG Oral Tab Take 1 tablet by mouth every 6 (six) hours as needed.    gabapentin 300 MG Oral Cap Take 1 capsule (300 mg total) by mouth nightly.    docusate sodium 100 MG Oral Cap Take 100 mg by mouth 2 (two) times daily.    traMADol 50 MG Oral Tab Take 1 tablet (50 mg total) by mouth every 6 (six) hours as needed.    metFORMIN 850 MG Oral Tab Take 1 tablet (850 mg total) by mouth in the morning, at noon, and at bedtime.    SITagliptin Phosphate (JANUVIA) 100 MG Oral Tab Take 1 tablet (100 mg total) by mouth daily.   [7] No Known Allergies

## 2025-05-29 NOTE — PLAN OF CARE
Patient is A/ox3, primarily Gujarati speaking, up x1 SBA with rolling walker, NPO, denies nausea, Q6 accuchecks, received PRN Morphine for pain management, IVF infused overnight, vital signs stable, no acute changes overnight. Call light within reach, safety measures in place, frequent rounding done, plan of care continued.

## 2025-05-29 NOTE — PLAN OF CARE
Vitals within normal limits. Room Air.   PRN Morphine and Norco for pain control.   AC/HS. On clear liquid diet. Denies nausea or vomiting.   Urostomy with clear, yellow urine.   CT Disc was delivered to CT control room this morning - pending upload?   Call light within reach. Family at bedside.  was used.     Problem: Patient Centered Care  Goal: Patient preferences are identified and integrated in the patient's plan of care  Description: Interventions:  - What would you like us to know as we care for you?   - Provide timely, complete, and accurate information to patient/family  - Incorporate patient and family knowledge, values, beliefs, and cultural backgrounds into the planning and delivery of care  - Encourage patient/family to participate in care and decision-making at the level they choose  - Honor patient and family perspectives and choices  Outcome: Progressing     Problem: Diabetes/Glucose Control  Goal: Glucose maintained within prescribed range  Description: INTERVENTIONS:  - Monitor Blood Glucose as ordered  - Assess for signs and symptoms of hyperglycemia and hypoglycemia  - Administer ordered medications to maintain glucose within target range  - Assess barriers to adequate nutritional intake and initiate nutrition consult as needed  - Instruct patient on self management of diabetes  Outcome: Progressing

## 2025-05-29 NOTE — PHYSICAL THERAPY NOTE
Physical Therapy Defer Note    Orders received and chart reviewed. Attempted to see patient for Physical Therapy services at 0820. Patient is not appropriate for therapy at this time secondary to per chart review, patient transferred from [facility] for anticipated appendectomy. Decision made to defer therapy following a discussion with the RN. Will re-schedule visit as patient is medically able to participate. If patient has surgery, will need updated therapy orders post-operatively.     Monika Aguirre, PT, DPT  [facility]  Rehab Services - Physical Therapy  [phone]

## 2025-05-29 NOTE — OCCUPATIONAL THERAPY NOTE
OT orders rcvd; pt is a t/f from [facility]; per chart       \"Reviewed treatment possibilities with pt and family both in the room and on the phone; we discussed abx management alone and surgery.  Due to her ileal conduit and the fact that we do not have urologists on staff who perform this surgery she prefers to transfer to [facility] where her surgeon is to undergo an appendectomy. Discussed with [facility] and have signed the necessary paperwork  Pt and family aware that there may be a delay in her care due to the desire to transfer, they verbalized understanding and accept the risk.  Currently on meropenem (h/o ESBL urine)       No POC currently in chart, will defer OT evaluation until POC is established; if plan for surgery, will NNO post operatively.     Harjit Downs, Occupational Therapist, OTR/L ext [phone]

## 2025-05-29 NOTE — PROGRESS NOTES
[facility]  Report of Consultation  Is this a shared or split note between Advanced Practice Provider and Physician? Yes   Michael Ibarra Patient Status:  Inpatient    1950 MRN [id number]   Location [facility] 4W/SW/SE Attending Jeffy Ibarra MD   Hosp Day # 1 PCP SOURAV FARR     Requesting Physician:  Dr. Jeffy Ibarra    Reason for Consultation:  Possible acute appendicitis     Chief Complaint:  Abdominal pain    History of Present Illness:  Patient is Gujarati speaking, combination of language line and [place]daleh used for translation.  Michael Ibarra is a 75 year old female with PMH of SCC of bladder s/p radical cystectomy and formation of ileal conduit urinary diversion 2024.    She presents to [facility] on 2025 with complaints of RLQ abdominal pain for the last 2 days.  She reports she has not experienced this pain before.  The pain is severe and constant.  She denies associated nausea or vomiting.  She denies fever or chills.  She denies diarrhea or constipation.  Her last bowel movement was 2 days ago.  She denies chest pain, SOB.  She does not note changes in output from ileal conduit.    Patient initially admitted to [place] through the hospital.  Workup revealed no leukocytosis, UA revealed UTI.  She was placed on IV antibiotics.  CTAP with findings of inflammatory changes and RLQ with findings raising concern for acute appendicitis without abscess.  The scan is unavailable to review currently.  General surgery was consulted, surgery was not recommending surgery at the time due to high suspicion of inflammatory changes from UTI and recent surgery.  Patient was transferred to [place] due to urologist practicing at this hospital.    Upon presentation to the hospital, patient afebrile, hemodynamically stable.   Patient says her pain is managed with analgesics, still present.  Hemoglobin 9.6.  BUN 41, creatinine  2.65.  She is not currently passing gas    Past medical history as above mention, HLD, DM, CKD, multiple myeloma    Past abdominal surgical history as above mentioned.      History:  Past Medical History[1]  Past Surgical History[2]  Family History[3]   reports that she has never smoked. She has never used smokeless tobacco. She reports that she does not drink alcohol and does not use drugs.    Allergies:  Allergies[4]    Medications:  Prescriptions Prior to Admission[5]    Current Hospital Medications[6]    Review of Systems:  Review of Systems   Constitutional:  Negative for chills, fatigue and fever.   Respiratory:  Negative for shortness of breath and wheezing.    Cardiovascular:  Negative for chest pain and leg swelling.   Gastrointestinal:  Positive for abdominal pain. Negative for nausea, vomiting, diarrhea and constipation.   Genitourinary:  Negative for dysuria, hematuria and flank pain.   Neurological:  Negative for dizziness and weakness.       Physical Exam:  /57 (BP Location: Left arm)   Pulse 81   Temp 98 °F (36.7 °C) (Oral)   Resp 18   Ht 5' 5\" (1.651 m)   Wt 128 lb 15.5 oz (58.5 kg)   SpO2 93%   BMI 21.46 kg/m²   Physical Exam  Constitutional:       General: She is not in acute distress.     Appearance: Normal appearance. She is not ill-appearing or toxic-appearing.   HENT:      Mouth/Throat:      Mouth: Mucous membranes are moist.      Pharynx: Oropharynx is clear.   Eyes:      Conjunctiva/sclera: Conjunctivae normal.   Cardiovascular:      Rate and Rhythm: Normal rate and regular rhythm.      Pulses: Normal pulses.   Pulmonary:      Effort: Pulmonary effort is normal.      Breath sounds: Normal breath sounds.   Abdominal:      General: Abdomen is flat. There is no distension.      Palpations: Abdomen is soft.      Tenderness: There is abdominal tenderness in the right lower quadrant. There is no guarding or rebound.      Comments: Ileal conduit with ostomy appliance and clear yellow  output   Musculoskeletal:         General: No swelling.   Skin:     General: Skin is warm and dry.   Neurological:      General: No focal deficit present.      Mental Status: She is alert and oriented to person, place, and time.   Psychiatric:         Mood and Affect: Mood normal.         Behavior: Behavior normal.         Laboratory Data:  Recent Labs   Lab 05/29/25  0825   RBC 3.04*   HGB 9.6*   HCT 29.6*   MCV 97.4   MCH 31.6   MCHC 32.4   RDW 12.7   NEPRELIM 6.24   WBC 7.8   .0       Recent Labs   Lab 05/29/25  0825   *   BUN 41*   CREATSERUM 2.65*   CA 8.8   ALB 3.8      K 4.6      CO2 19.0*   ALKPHO 84   AST 9   ALT <7*   BILT 0.3   TP 6.7         No results for input(s): \"PTP\", \"INR\", \"PTT\" in the last 168 hours.      No results found.              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 Barnes. No acute surgical intervention planned at this time. Awaiting CTAP scan from [facility] to become available. Recommends urology consultation for further management recommendations.   Keep NPO  Continue IV fluids and abx  Analgesics and antiemetics as needed  Dr. Barnes to evaluate and provide further surgical recommendations.     Thank you for allowing us to participate in the care of the patient.   NELL Ladd  5/29/2025  12:17 PM                          [1]   Past Medical History:   Back problem    Cancer (HCC)    bladder    Cataract    Diabetes (HCC)    Osteoarthritis   [2]   Past Surgical History:  Procedure Laterality Date    Cataract Bilateral    [3] No family history on file.  [4] No Known Allergies  [5]   Medications Prior to Admission   Medication Sig    amLODIPine 5 MG Oral Tab Take 1 tablet (5 mg total) by mouth daily.    Ferrous  Sulfate 324 (65 Fe) MG Oral Tab EC Take 324 mg by mouth in the morning and 324 mg before bedtime.    Glycerin-Polysorbate 80 (REFRESH DRY EYE THERAPY OP) Apply to eye.    Glycerin-Polysorbate 80 (REFRESH DRY EYE THERAPY OP) Apply to eye.    glipiZIDE 10 MG Oral Tab Take 1 tablet (10 mg total) by mouth in the morning and 1 tablet (10 mg total) in the evening. Take before meals.    HYDROcodone-acetaminophen 5-325 MG Oral Tab Take 1 tablet by mouth every 6 (six) hours as needed.    gabapentin 300 MG Oral Cap Take 1 capsule (300 mg total) by mouth nightly.    docusate sodium 100 MG Oral Cap Take 100 mg by mouth 2 (two) times daily.    traMADol 50 MG Oral Tab Take 1 tablet (50 mg total) by mouth every 6 (six) hours as needed.    metFORMIN 850 MG Oral Tab Take 1 tablet (850 mg total) by mouth in the morning, at noon, and at bedtime.    SITagliptin Phosphate (JANUVIA) 100 MG Oral Tab Take 1 tablet (100 mg total) by mouth daily.   [6]   Current Facility-Administered Medications:     amLODIPine (Norvasc) tab 5 mg, 5 mg, Oral, Daily    ferrous sulfate DR tab 325 mg, 325 mg, Oral, BID    gabapentin (Neurontin) cap 300 mg, 300 mg, Oral, Nightly    glucose (Dex4) 15 GM/59ML oral liquid 15 g, 15 g, Oral, Q15 Min PRN **OR** glucose (Glutose) 40% oral gel 15 g, 15 g, Oral, Q15 Min PRN **OR** glucose-vitamin C (Dex-4) chewable tab 4 tablet, 4 tablet, Oral, Q15 Min PRN **OR** dextrose 50% injection 50 mL, 50 mL, Intravenous, Q15 Min PRN **OR** glucose (Dex4) 15 GM/59ML oral liquid 30 g, 30 g, Oral, Q15 Min PRN **OR** glucose (Glutose) 40% oral gel 30 g, 30 g, Oral, Q15 Min PRN **OR** glucose-vitamin C (Dex-4) chewable tab 8 tablet, 8 tablet, Oral, Q15 Min PRN    insulin aspart (NovoLOG) 100 Units/mL FlexPen 1-7 Units, 1-7 Units, Subcutaneous, TID CC    lactated ringers infusion, , Intravenous, Continuous    acetaminophen (Tylenol) tab 650 mg, 650 mg, Oral, Q4H PRN **OR** HYDROcodone-acetaminophen (Norco) 5-325 MG per tab 1 tablet, 1  tablet, Oral, Q4H PRN **OR** HYDROcodone-acetaminophen (Norco) 5-325 MG per tab 2 tablet, 2 tablet, Oral, Q4H PRN    morphINE PF 2 MG/ML injection 1 mg, 1 mg, Intravenous, Q2H PRN **OR** morphINE PF 2 MG/ML injection 2 mg, 2 mg, Intravenous, Q2H PRN **OR** morphINE PF 4 MG/ML injection 4 mg, 4 mg, Intravenous, Q2H PRN    polyethylene glycol (PEG 3350) (Miralax) 17 g oral packet 17 g, 17 g, Oral, Daily PRN    sennosides (Senokot) tab 17.2 mg, 17.2 mg, Oral, Nightly PRN    bisacodyl (Dulcolax) 10 MG rectal suppository 10 mg, 10 mg, Rectal, Daily PRN    fleet enema (Fleet) rectal enema 133 mL, 1 enema, Rectal, Once PRN    ondansetron (Zofran) 4 MG/2ML injection 4 mg, 4 mg, Intravenous, Q6H PRN    metoclopramide (Reglan) 5 mg/mL injection 10 mg, 10 mg, Intravenous, Q8H PRN  [7]   Patient Active Problem List  Diagnosis    Ostomy nurse consultation    Bladder cancer (HCC)    Acute appendicitis    JUAN MANUEL (acute kidney injury)    CKD (chronic kidney disease) stage 4, GFR 15-29 ml/min ([facility])

## 2025-05-30 PROBLEM — N39.0 URINARY TRACT INFECTION WITHOUT HEMATURIA: Status: ACTIVE | Noted: 2025-05-30

## 2025-05-30 LAB
ALBUMIN SERPL-MCNC: 3.8 G/DL (ref 3.2–4.8)
ANION GAP SERPL CALC-SCNC: 10 MMOL/L (ref 0–18)
BASOPHILS # BLD AUTO: 0.03 X10(3) UL (ref 0–0.2)
BASOPHILS NFR BLD AUTO: 0.5 %
BILIRUB UR QL: NEGATIVE
BUN BLD-MCNC: 37 MG/DL (ref 9–23)
BUN/CREAT SERPL: 14.9 (ref 10–20)
CALCIUM BLD-MCNC: 8.7 MG/DL (ref 8.7–10.4)
CHLORIDE SERPL-SCNC: 109 MMOL/L (ref 98–112)
CLARITY UR: CLEAR
CO2 SERPL-SCNC: 18 MMOL/L (ref 21–32)
COLOR UR: COLORLESS
CREAT BLD-MCNC: 2.48 MG/DL (ref 0.55–1.02)
DEPRECATED RDW RBC AUTO: 45.8 FL (ref 35.1–46.3)
EGFRCR SERPLBLD CKD-EPI 2021: 20 ML/MIN/1.73M2 (ref 60–?)
EOSINOPHIL # BLD AUTO: 0.24 X10(3) UL (ref 0–0.7)
EOSINOPHIL NFR BLD AUTO: 3.9 %
ERYTHROCYTE [DISTWIDTH] IN BLOOD BY AUTOMATED COUNT: 12.8 % (ref 11–15)
GLUCOSE BLD-MCNC: 118 MG/DL (ref 70–99)
GLUCOSE BLDC GLUCOMTR-MCNC: 100 MG/DL (ref 70–99)
GLUCOSE BLDC GLUCOMTR-MCNC: 124 MG/DL (ref 70–99)
GLUCOSE BLDC GLUCOMTR-MCNC: 126 MG/DL (ref 70–99)
GLUCOSE BLDC GLUCOMTR-MCNC: 131 MG/DL (ref 70–99)
GLUCOSE UR-MCNC: 70 MG/DL
HCT VFR BLD AUTO: 29.1 % (ref 35–48)
HGB BLD-MCNC: 9.4 G/DL (ref 12–16)
IMM GRANULOCYTES # BLD AUTO: 0.05 X10(3) UL (ref 0–1)
IMM GRANULOCYTES NFR BLD: 0.8 %
KETONES UR-MCNC: NEGATIVE MG/DL
LEUKOCYTE ESTERASE UR QL STRIP.AUTO: 500
LYMPHOCYTES # BLD AUTO: 0.73 X10(3) UL (ref 1–4)
LYMPHOCYTES NFR BLD AUTO: 11.9 %
MAGNESIUM SERPL-MCNC: 2 MG/DL (ref 1.6–2.6)
MCH RBC QN AUTO: 31.3 PG (ref 26–34)
MCHC RBC AUTO-ENTMCNC: 32.3 G/DL (ref 31–37)
MCV RBC AUTO: 97 FL (ref 80–100)
MONOCYTES # BLD AUTO: 0.47 X10(3) UL (ref 0.1–1)
MONOCYTES NFR BLD AUTO: 7.6 %
NEUTROPHILS # BLD AUTO: 4.63 X10 (3) UL (ref 1.5–7.7)
NEUTROPHILS # BLD AUTO: 4.63 X10(3) UL (ref 1.5–7.7)
NEUTROPHILS NFR BLD AUTO: 75.3 %
NITRITE UR QL STRIP.AUTO: NEGATIVE
OSMOLALITY SERPL CALC.SUM OF ELEC: 294 MOSM/KG (ref 275–295)
PH UR: 6.5 [PH] (ref 5–8)
PHOSPHATE SERPL-MCNC: 4.4 MG/DL (ref 2.4–5.1)
PLATELET # BLD AUTO: 188 10(3)UL (ref 150–450)
POTASSIUM SERPL-SCNC: 4.6 MMOL/L (ref 3.5–5.1)
PROT UR-MCNC: NEGATIVE MG/DL
RBC # BLD AUTO: 3 X10(6)UL (ref 3.8–5.3)
SODIUM SERPL-SCNC: 137 MMOL/L (ref 136–145)
SP GR UR STRIP: 1.01 (ref 1–1.03)
UROBILINOGEN UR STRIP-ACNC: NORMAL
WBC # BLD AUTO: 6.2 X10(3) UL (ref 4–11)

## 2025-05-30 PROCEDURE — 99233 SBSQ HOSP IP/OBS HIGH 50: CPT | Performed by: INTERNAL MEDICINE

## 2025-05-30 PROCEDURE — 99232 SBSQ HOSP IP/OBS MODERATE 35: CPT | Performed by: STUDENT IN AN ORGANIZED HEALTH CARE EDUCATION/TRAINING PROGRAM

## 2025-05-30 RX ORDER — ENOXAPARIN SODIUM 100 MG/ML
30 INJECTION SUBCUTANEOUS DAILY
Status: DISCONTINUED | OUTPATIENT
Start: 2025-05-30 | End: 2025-06-02

## 2025-05-30 RX ORDER — ENOXAPARIN SODIUM 100 MG/ML
40 INJECTION SUBCUTANEOUS DAILY
Status: DISCONTINUED | OUTPATIENT
Start: 2025-05-30 | End: 2025-05-30 | Stop reason: DRUGHIGH

## 2025-05-30 RX ORDER — SODIUM BICARBONATE 650 MG/1
650 TABLET ORAL ONCE
Status: COMPLETED | OUTPATIENT
Start: 2025-05-30 | End: 2025-05-30

## 2025-05-30 NOTE — PROGRESS NOTES
[facility]  part of [facility] Infectious Disease  Progress Note    Michael Ibarra Patient Status:  Inpatient    1950 MRN [id number]   Location [facility] 4W/SW/SE Attending Jeffy Ibarra MD   Hosp Day # 2 PCP SOURAV FARR     Subjective:  Patient seen/examined.  Up in bed in NAD.  No F/C.  Patient did have some emesis this a.m. but is feeling better overall.    Objective:  Blood pressure 107/60, pulse 56, temperature 98.3 °F (36.8 °C), temperature source Axillary, resp. rate 18, height 5' 5\" (1.651 m), weight 128 lb 15.5 oz (58.5 kg), SpO2 98%.    Intake/Output:    Intake/Output Summary (Last 24 hours) at 2025 1159  Last data filed at 2025 1120  Gross per 24 hour   Intake 1195 ml   Output 1625 ml   Net -430 ml       Physical Exam:  General: Awake, alert, non-tox, NAD.  HEENT:  Oropharynx clear, trachea ML.  Heart: RRR S1S2 no murmurs.  Lungs: Essentially CTA b/l, no rhonchi, rales, wheezes.  Abdomen: Soft, less tenderness.  Urostomy stable.  Extremity: No edema.  Neurological: No focal deficits.  Derm:  Warm, dry, free from rashes.    Lab Data Review:  Lab Results   Component Value Date    WBC 6.2 2025    HGB 9.4 2025    HCT 29.1 2025    .0 2025    CREATSERUM 2.48 2025    BUN 37 2025     2025    K 4.6 2025     2025    CO2 18.0 2025     2025    CA 8.7 2025    ALB 3.8 2025    MG 2.0 2025    PHOS 4.4 2025      Cultures:   Recent ESBL e.coli UTI as an outpatient    Radiology:  Impression     Inflammatory changes in the right lower quadrant with findings  raising concern for acute appendicitis. No abscess.    Recurrent bilateral hydroureteronephrosis with ileal loop diversion right  lower quadrant noted.     Assessment and Plan:     ESBL e.coli UTI in this woman with a h/o bladder cancer s/p urostomy  - Has  had nephrostomy tubes in the past as well  - CT with recurrent b/l hydronephrosis  - IV meropenem started 5/29/25, will continue     2.  RLQ abdominal pain with CT evidence of appendicitis  - General surgery input reviewed, no immediate plans for OR     3.  Disposition - inpatient.  No F/C, leukocytosis, or gross s/s of sepsis.  Continue IV meropenem for both GI and  coverage.  No immediate plans for general surgery or urologic interventions.  Trending temps and WBCs.  Duration of IV antibiotics to be determined pending clinical course and any surgical/interventional needs.  Will follow.    Jeniffer Ramirez DO, [facility] Infectious Disease  [phone]    5/30/2025  11:59 AM

## 2025-05-30 NOTE — PROGRESS NOTES
[facility] Hospitalist Progress Note     CC: Hospital Follow up    PCP: SORUAV FARR       Assessment/Plan:     Active Problems:    Acute appendicitis    JUAN MANUEL (acute kidney injury)    CKD (chronic kidney disease) stage 4, GFR 15-29 ml/min ([facility])    Right lower quadrant abdominal pain    75 year old female with HTN, HLD, NIDDM, CKD, Multiple Myeloma, and squamous cell bladder CA s/p cystectomy with ileal conduit and history of hydronephrosis and ESBL UTI and found to have an acute UTI started on meropenem 5/29 who was transferred to [facility] for management of possible appendicitis c/b ileal conduit urostomy.      RLQ pain, likely 2/2 appendicitis c/b UTI and ileal conduit urostomy   History of radical cystectomy, hysterectomy, and bilateral salpingo-oophorectomy due to squamous cell bladder CA   CT: Inflammatory changes in the right lower quadrant with findings raising concern for acute appendicitis. No abscess. Recurrent bilateral hydroureteronephrosis with ileal loop diversion right lower quadrant noted.  General surgery and Urology with recommendation for conservative management and gradual advancement of diet while remaining and clear liquids today.      Plan:   Patient CLD, IVF   Acetaminophen, morphine for pain. Bowel regimen in place.   Consults: General Surgery, Urology, ID, Nephrology   Continue conservative management, slow increase of diet given some nausea this morning and yesterday   Abx as below   Follow-up previous CT results      UTI  History of ESBL UTI and hydroureteronephrosis   Leukocytosis, improving  UA with positive nitrites and leuks, history of ESBL    Given Meropenem prior to transfer   CBC 5/28 without leukocytosis  Remains afebrile  ID recommending continued meropenem for GI and  coverage     JUAN MANUEL on CKD, likely 2/2 reflux from conduit and hydronephrosis  Anemia, chronic  Some improvement in Cr 5/30  On Iron PO therapy  Continue sodium bicarbonate      Plan:   Avoid  nephrotoxic medications, titrate doses, when appropriate   Appreciate nephrology recommendations     Chronic problems:     HTN - Continue Amlodipine      HLD - Hold statin      NIDDM   - Hold PO Medications,   - Correctional Algorithm  - Gabapentin for neuropathy       FN:  - IVF:LR 75ml/hr   - Diet: CLD, advance slowly      DVT Prophy:Held  Lines:PIV     Dispo: Pending clinical course   Code status: Full    Questions/concerns were discussed with patient and/or family by bedside.    Total Time spent with patient and coordinating care:  >30 minutes    Thank You,  Jeffy Ibarra MD    Hospitalist with [facility]     Subjective:     Patient doing well today with improvement in symptoms but no resolution. Nausea improved with medications. Recommend gradual increase in diet, discussed with patient current plan of medical treatment. All questions answered and discussed.     OBJECTIVE:    Blood pressure 104/66, pulse 58, temperature 99.2 °F (37.3 °C), temperature source Oral, resp. rate 16, height 5' 5\" (1.651 m), weight 128 lb 15.5 oz (58.5 kg), SpO2 96%.    Temp:  [98 °F (36.7 °C)-99.2 °F (37.3 °C)] 99.2 °F (37.3 °C)  Pulse:  [58-81] 58  Resp:  [16-18] 16  BP: ()/(57-68) 104/66  SpO2:  [93 %-96 %] 96 %      Intake/Output:    Intake/Output Summary (Last 24 hours) at 5/30/2025 0801  Last data filed at 5/30/2025 0600  Gross per 24 hour   Intake 1195 ml   Output 1675 ml   Net -480 ml       Last 3 Weights   05/28/25 2037 128 lb 15.5 oz (58.5 kg)   07/08/24 0626 123 lb (55.8 kg)   06/25/24 1207 130 lb (59 kg)   07/05/24 1304 120 lb (54.4 kg)       Exam   Physical Exam  Vitals reviewed.   Constitutional:       General: She is not in acute distress.     Appearance: Normal appearance. She is not ill-appearing, toxic-appearing or diaphoretic.      Comments: Patient is more well appearing than yesterday   Cardiovascular:      Rate and Rhythm: Normal rate and regular rhythm.      Pulses: Normal pulses.      Heart  sounds: Normal heart sounds.   Pulmonary:      Effort: Pulmonary effort is normal.      Breath sounds: Normal breath sounds.   Abdominal:      Palpations: Abdomen is soft.      Tenderness: There is abdominal tenderness in the right lower quadrant, periumbilical area and suprapubic area.      Comments: Notable improvement in tenderness overnight.    Skin:     General: Skin is warm and dry.      Capillary Refill: Capillary refill takes less than 2 seconds.   Neurological:      General: No focal deficit present.      Mental Status: She is alert and oriented to person, place, and time. Mental status is at baseline.   Psychiatric:         Mood and Affect: Mood normal.         Behavior: Behavior normal.             Data Review:       Labs:     Recent Labs   Lab 05/29/25 0825 05/30/25  0639   RBC 3.04* 3.00*   HGB 9.6* 9.4*   HCT 29.6* 29.1*   MCV 97.4 97.0   MCH 31.6 31.3   MCHC 32.4 32.3   RDW 12.7 12.8   NEPRELIM 6.24 4.63   WBC 7.8 6.2   .0 188.0         Recent Labs   Lab 05/29/25  0825 05/30/25  0639   * 118*   BUN 41* 37*   CREATSERUM 2.65* 2.48*   EGFRCR 18* 20*   CA 8.8 8.7    137   K 4.6 4.6    109   CO2 19.0* 18.0*       Recent Labs   Lab 05/29/25  0825 05/30/25  0639   ALT <7*  --    AST 9  --    ALB 3.8 3.8         Imaging:  No results found.      Meds:     Scheduled Medications[1]  Medication Infusions[2]  PRN Medications[3]           [1]    meropenem  500 mg Intravenous q12h    amLODIPine  5 mg Oral Daily    ferrous sulfate  325 mg Oral BID    gabapentin  300 mg Oral Nightly    insulin aspart  1-7 Units Subcutaneous TID CC   [2]    lactated ringers 75 mL/hr at 05/29/25 2218   [3]   glucose **OR** glucose **OR** glucose-vitamin C **OR** dextrose **OR** glucose **OR** glucose **OR** glucose-vitamin C    acetaminophen **OR** HYDROcodone-acetaminophen **OR** HYDROcodone-acetaminophen    morphINE **OR** morphINE **OR** morphINE    polyethylene glycol (PEG 3350)    sennosides     bisacodyl    fleet enema    ondansetron    metoclopramide

## 2025-05-30 NOTE — PROGRESS NOTES
[facility]  Progress Note    Marykira Dee Deeaudrey Snellel Patient Status:  Inpatient    1950 MRN [id number]   Location [facility] 4W/SW/SE Attending Jeffy Ibarra MD   Hosp Day # 2 PCP SOURAV FARR     Subjective:   was used during the encounter, ID: [id number]. Patient is resting in bed. She reports vomiting twice in the morning. She reports feeling better. She states pain is controlled with analgesics and is improved compared to yesterday. She is passing gas, denies bowel movements. She is ambulating around the room    Objective/Physical Exam:  General: Alert, orientated x3.  Cooperative.  No apparent distress.  Vital Signs:  Blood pressure 103/60, pulse 58, temperature 99.2 °F (37.3 °C), temperature source Oral, resp. rate 16, height 65\", weight 128 lb 15.5 oz (58.5 kg), SpO2 96%.  Wt Readings from Last 3 Encounters:   25 128 lb 15.5 oz (58.5 kg)   24 123 lb (55.8 kg)   24 120 lb (54.4 kg)     Lungs: No respiratory distress.  Cardiac: Regular rate and rhythm.   Abdomen:  Soft, not distended, mild RLQ discomfort to palpation, with no rebound or guarding.  No peritoneal signs. RLQ urostomy is pink and functioning   Extremities:  No lower extremity edema noted.      Intake/Output:    Intake/Output Summary (Last 24 hours) at 2025 0920  Last data filed at 2025 0600  Gross per 24 hour   Intake 1195 ml   Output 1375 ml   Net -180 ml     I/O last 3 completed shifts:  In: 1195 [P.O.:360; I.V.:835]  Out: 2575 [Urine:2575]  No intake/output data recorded.    Medications: Scheduled Medications[1]    Labs:  Lab Results   Component Value Date    WBC 6.2 2025    HGB 9.4 2025    HCT 29.1 2025    .0 2025     Lab Results   Component Value Date     2025    K 4.6 2025     2025    CO2 18.0 2025    BUN 37 2025    CREATSERUM 2.48 2025     2025    CA 8.7 2025    ALB 3.8  05/30/2025     No results found for: \"PT\", \"INR\"      Assessment  Problem List[2]  Possible Appendicitis on CT at outside facility  ESBL e.coli UTI   Hx of squamous cell carcinoma of bladder s/p robotic cystectomy on  07/2024 with ileal conduit    Plan:  Will continue to proceed with conservative management of possible acute appendicitis with IV antibiotics and bowel rest  Awaiting CTAP scan from [facility]  Appendicitis and UTI managed with antibiotics by Infectious Disease  Analgesics as needed for pain control  Continue clear liquid diet. Possible advancement of diet later today pending the patient's nausea  Ambulate and up to chair   DVT prophylaxis with SCDs and lovenox  GI prophylaxis with protonix    Quality:  DVT Mechanical Prophylaxis:   SCDs, Early ambuation  DVT Pharmacologic Prophylaxis   Medication    enoxaparin (Lovenox) 40 MG/0.4ML SUBQ injection 40 mg                Code Status: Full Code  Ramos: No urinary catheter in place  Ramos Duration (in days):   Central line:    ANISHA: 5/31/2025        ROX ManS  5/30/2025  9:08 AM      The patient was discussed with Leslie Dominguez PA-C    The patient was seen and examined with Yesica WASHINGTON. I agree with the above assessment and plan.     Leslie Dominguez PA-C  5/30/2025  9:23 AM                [1]    meropenem  500 mg Intravenous q12h    enoxaparin  40 mg Subcutaneous Daily    pantoprazole  40 mg Intravenous Q24H    amLODIPine  5 mg Oral Daily    ferrous sulfate  325 mg Oral BID    gabapentin  300 mg Oral Nightly    insulin aspart  1-7 Units Subcutaneous TID CC   [2]   Patient Active Problem List  Diagnosis    Ostomy nurse consultation    Bladder cancer (HCC)    Acute appendicitis    JUAN MANUEL (acute kidney injury)    CKD (chronic kidney disease) stage 4, GFR 15-29 ml/min (HCC)    Right lower quadrant abdominal pain

## 2025-05-30 NOTE — PLAN OF CARE
Patient is alert and oriented. On room air. Patient had 2 episodes of emesis. Zofran and Reglan provided. Patient not tolerating clear liquid diet. Reports drinking water makes her nauseous. ACHS accuchecks. Norco prn given for pain. Patients moves standby assist with walker. Patient has a urostomy with output. Call light within reach. Safety measures in place.     Problem: Patient Centered Care  Goal: Patient preferences are identified and integrated in the patient's plan of care  Description: Interventions:  - What would you like us to know as we care for you? From home with family  - Provide timely, complete, and accurate information to patient/family  - Incorporate patient and family knowledge, values, beliefs, and cultural backgrounds into the planning and delivery of care  - Encourage patient/family to participate in care and decision-making at the level they choose  - Honor patient and family perspectives and choices  Outcome: Progressing     Problem: Diabetes/Glucose Control  Goal: Glucose maintained within prescribed range  Description: INTERVENTIONS:  - Monitor Blood Glucose as ordered  - Assess for signs and symptoms of hyperglycemia and hypoglycemia  - Administer ordered medications to maintain glucose within target range  - Assess barriers to adequate nutritional intake and initiate nutrition consult as needed  - Instruct patient on self management of diabetes  Outcome: Progressing     Problem: Patient/Family Goals  Goal: Patient/Family Long Term Goal  Description: Patient's Long Term Goal: To be discharged    Interventions:  - Medications, diagnostics  - See additional Care Plan goals for specific interventions  Outcome: Progressing  Goal: Patient/Family Short Term Goal  Description: Patient's Short Term Goal:     Interventions:   -  - See additional Care Plan goals for specific interventions  Outcome: Progressing

## 2025-05-30 NOTE — PHYSICAL THERAPY NOTE
PHYSICAL THERAPY EVALUATION - INPATIENT     Room Number: 442/442-A  Evaluation Date: 5/30/2025  Type of Evaluation: Initial   Physician Order: PT Eval and Treat    Presenting Problem: ABD pain r/o appendicitis -JUAN MNAUEL on CKD , UTI ESBL . PMH sign for bladder CA with ostomy placement 7/24 .     Reason for Therapy: Mobility Dysfunction and Discharge Planning    PHYSICAL THERAPY ASSESSMENT   Chart reviewed , Language line used [id number] , Daniel .   Pt is alert and oriented x 3 agreeable to mobility . Primary care MD present approving therapy participation and activity .     Patient is a 75 year old female admitted 5/28/2025 for Presenting Problem: ABD pain r/o appendicitis -JUAN MANUEL on CKD , UTI ESBL . PMH sign for bladder CA with ostomy placement 7/24 ..  Prior to admission, patient's baseline is Indep ADL and community ambulation with intermittent use of SPC . Pt denies hx of falls with chronic ostomy for urine.  Pt reports chronic B Knee pain during activity . Pt denies any significant change in ABD pain during activity . Pt feeling better since admission.       Patient reports she feels is currently functioning near her baseline with bed mobility, transfers, gait, stair negotiation, standing prolonged periods, and performing household tasks.  Patient is requiring contact guard assist as a result of the following impairments: decreased functional strength, decreased endurance/aerobic capacity, pain, impaired standing  balance, decreased muscular endurance, and medical status.  Physical Therapy will continue to follow for duration of hospitalization.    Patient will benefit from continued skilled PT Services at discharge to promote prior level of function and safety with additional support and return home with home health PT. No family is present. Pt reports goal for DC to home setting with her family stating she has proper support in the home. Pt reports has a RW can use at DC . Pt denies any concern for DC to home setting or  negotiating stairs for entry into home as has B rails.   Pt denies need for HH PT at DC when discussed DC Plan with pt.   Therapy encouraged use of RW .     PLAN DURING HOSPITALIZATION  Nursing Mobility Recommendation : 1 Assist  PT Device Recommendation: Rolling walker, Gait belt  PT Treatment Plan: Bed mobility, Body mechanics, Endurance, Energy conservation, Patient education, Family education, Gait training, Balance training, Transfer training, Stair training  Rehab Potential : Good  Frequency (Obs): 5x/week     PHYSICAL THERAPY MEDICAL/SOCIAL HISTORY   History related to current admission:from sx team    ssessment  [Problem List]    [Problem List]  Patient Active Problem List      Diagnosis    Ostomy nurse consultation    Bladder cancer (HCC)    Acute appendicitis    JUAN MANUEL (acute kidney injury)    CKD (chronic kidney disease) stage 4, GFR 15-29 ml/min (HCC)    Right lower quadrant abdominal pain     Possible Appendicitis on CT at outside facility  ESBL e.coli UTI   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 Possible advancement of diet later today pending the patient's nausea  Ambulate and up to chair   DVT prophylaxis with SCDs and lovenox  GI prophylaxis with protonix     Quality:  DVT Mechanical Prophylaxis:   SCDs, Early ambuation      DVT Pharmacologic Prophylaxis   Medication    enoxaparin (Lovenox) 40 MG/0.4ML SUBQ injection 40 mg                 Code Status: Full Code  Ramos: No urinary catheter in place  Ramos Duration (in days):   Central line:    ANISHA: 5/31/2025           Yesica Buck PA-S  5/30/2025  9:08 AM    Problem List  Active Problems:    Acute  appendicitis    JUAN MANUEL (acute kidney injury)    CKD (chronic kidney disease) stage 4, GFR 15-29 ml/min ([facility])    Right lower quadrant abdominal pain      HOME SITUATION  Type of Home: House  Home Layout: One level, Able to live on main level  Stairs to Enter : 5   Railing:  (B rails)              Lives With: Family, Son        Patient Regularly Uses:  (Indep ADL and intermittent community mobility with either RW or cane)         SUBJECTIVE  Agreeable to mobility     Chronic B Knee pain    Denies increase or change in ABD pain overall feeling better at time of PT visit .      Pt with goal for DC to home settiing     PHYSICAL THERAPY EXAMINATION   OBJECTIVE  Precautions: Abdominal protective strategies, Bed/chair alarm,  needed  Fall Risk: Standard fall risk    WEIGHT BEARING RESTRICTION       PAIN ASSESSMENT  Rating: Other (Comment)  Location: minimal pain reported stating feeling better and pain well controlled no significant change with activity  Management Techniques: Activity promotion, Breathing techniques, Relaxation, Repositioning (ABD sparing fxn mobility reinforced)    COGNITION  Overall Cognitive Status:  WFL - within functional limits    RANGE OF MOTION AND STRENGTH ASSESSMENT  Upper extremity ROM and strength are within functional limits   Lower extremity ROM is within functional limits   Lower extremity strength is within functional limits    returned demonstration of B AP     BALANCE  Static Sitting: Good  Dynamic Sitting: Fair +  Static Standing: Fair -  Dynamic Standing: Fair -          ACTIVITY TOLERANCE  Pulse: 94 (post activity   resting 75)        BP: 144/81 (post activity  resting 128/64)             O2 WALK  Oxygen Therapy  SPO2% on Room Air at Rest: 94  SPO2% Ambulation on Room Air: 99    AM-PAC '6-Clicks' INPATIENT SHORT FORM - BASIC MOBILITY  How much difficulty does the patient currently have...  Patient Difficulty: Turning over in bed (including adjusting bedclothes, sheets and  blankets)?: A Little   Patient Difficulty: Sitting down on and standing up from a chair with arms (e.g., wheelchair, bedside commode, etc.): A Little   Patient Difficulty: Moving from lying on back to sitting on the side of the bed?: A Little   How much help from another person does the patient currently need...   Help from Another: Moving to and from a bed to a chair (including a wheelchair)?: A Little   Help from Another: Need to walk in hospital room?: A Little   Help from Another: Climbing 3-5 steps with a railing?: A Little     AM-PAC Score:  Raw Score: 18   Approx Degree of Impairment: 46.58%   Standardized Score (AM-PAC Scale): 43.63   CMS Modifier (G-Code): CK    FUNCTIONAL ABILITY STATUS  Functional Mobility/Gait Assessment  Gait Assistance:  (SBA to CGA for lines and cues regarding posture)  Distance (ft): 220 ft x 1  Assistive Device: Rolling walker  Pattern: R Steppage, L Steppage, R Foot flat, L Foot flat  Stairs:  (discussed stair training , pt declining any concern with ability to negotiate stairs into home stating has two rails and takes her time.    NA)  Rolling: supervision  Supine to Sit: stand-by assist encouraged and demonstrated log roll sequencing . Pt while sitting at EOB able to anatoliy slipper socks with cues for compliance to ABD sparing sequencing.   Set up support and cues provided.     Sit to Stand: stand-by assist cues provided for proper sequencing and safety      Skilled Therapy Provided: PT eval completed , language line used , ABD sparing fxn mobility reinforced , B AP , pacing and DC Planning , Pt education   Education Provided To: Patient   Patient Education: Role of Physical Therapy, Plan of Care, Discharge Recommendations, DME Recommendations, Functional Transfer Techniques, Posture/Positioning, Energy Conservation, Proper Body Mechanics, Abdominal Protective Strategies, Gait Training (ABD sparing fxn mobility , pacing and home safety reinforced including recommendation for RW at  all times)   Patient's Response to Education: Verbalized Understanding, Returned Demonstration, Requires Further Education      The patient's Approx Degree of Impairment: 46.58% has been calculated based on documentation in the [facility] '6 clicks' Inpatient Basic Mobility Short Form.  Research supports that patients with this level of impairment may benefit from home with HH PT .  Final disposition will be made by interdisciplinary medical team.    Patient End of Session: Up in chair, Needs met, Call light within reach, RN aware of session/findings, All patient questions and concerns addressed, Hospital anti-slip socks, Alarm set    CURRENT GOALS  Goals to be met by: 6/15/25  Patient Goal Patient's self-stated goal is: home with family    Goal #1 Patient is able to demonstrate supine - sit EOB @ level: supervision     Goal #1   Current Status    Goal #2 Patient is able to demonstrate transfers EOB to/from Chair/Wheelchair at assistance level: supervision with LRAD     Goal #2  Current Status    Goal #3 Patient is able to ambulate 150-200  feet with assist device: walker - rolling at assistance level: supervision   Goal #3   Current Status    Goal #4 Patient will negotiate 5 stairs/one curb w/ assistive device and supervision   Goal #4   Current Status    Goal #5 Patient to demonstrate independence with home activity/exercise instructions provided to patient in preparation for discharge.   Goal #5   Current Status    Goal #6    Goal #6  Current Status      Patient Evaluation Complexity Level:  History Moderate - 1 or 2 personal factors and/or co-morbidities   Examination of body systems Low -  addressing 1-2 elements   Clinical Presentation  Moderate - Evolving   Clinical Decision Making  Moderate Complexity   PT eval and therapy activity 2 units

## 2025-05-30 NOTE — OCCUPATIONAL THERAPY NOTE
OCCUPATIONAL THERAPY EVALUATION - INPATIENT     Room Number: 442/442-A  Evaluation Date: 2025  Type of Evaluation: Initial  Presenting Problem: acute appendicitis    Physician Order: IP Consult to Occupational Therapy  Reason for Therapy: ADL/IADL Dysfunction and Discharge Planning    OCCUPATIONAL THERAPY ASSESSMENT   Patient is a 75 year old female admitted 2025.  Prior to admission, patient's baseline is mod I BADLs.  Patient is currently functioning near baseline with ADLs.    PLAN  Patient has been evaluated and presents with no skilled inpatient Occupational Therapy needs  at this time.  Patient will be discharged from Occupational Therapy services. Please re-order if a new functional limitation presents during this admission. Pt reported she was going to out-patient PT for L shoulder pain and may want to continue after dc.    OT Device Recommendations: None    OCCUPATIONAL THERAPY MEDICAL/SOCIAL HISTORY   Problem List  Active Problems:    Acute appendicitis    JUAN MANUEL (acute kidney injury)    CKD (chronic kidney disease) stage 4, GFR 15-29 ml/min ([facility])    Right lower quadrant abdominal pain    Urinary tract infection without hematuria    HOME SITUATION  Type of Home: House  Home Layout: One level; Able to live on main level  Lives With: Family; Son  Toilet and Equipment: Standard height toilet  Shower/Tub and Equipment: Shower chair  Patient Regularly Uses: Cane; Rolling walker      Prior Level of Wood: mod I BADLs, family assist with IADLs    SUBJECTIVE  \"I will get up, let me ask the RN for pain meds first\"    OCCUPATIONAL THERAPY EXAMINATION    OBJECTIVE  Precautions:  needed; Abdominal protective strategies  Fall Risk: Standard fall risk    WEIGHT BEARING RESTRICTION     PAIN ASSESSMENT  Ratin  Location: abdomen  Management Techniques: Nurse notified           COGNITION  Overall Cognitive Status:  WFL - within functional limits    VISION  Current Vision: no visual  deficits      Communication: [facility]  used    Behavioral/Emotional/Social: WFL    RANGE OF MOTION   Upper extremity ROM is within functional limits     STRENGTH ASSESSMENT  Upper extremity strength is within functional limits except L shoulder, ~[phone] has pain with ROM and was seeing physical therapy to improve ROM prior to hospital admit    COORDINATION  Gross Motor: L   Fine Motor: L     ACTIVITIES OF DAILY LIVING ASSESSMENT  AM-PAC ‘6-Clicks’ Inpatient Daily Activity Short Form  How much help from another person does the patient currently need…  -   Putting on and taking off regular lower body clothing?: None  -   Bathing (including washing, rinsing, drying)?: A Little  -   Toileting, which includes using toilet, bedpan or urinal? : None  -   Putting on and taking off regular upper body clothing?: None  -   Taking care of personal grooming such as brushing teeth?: A Little  -   Eating meals?: None    AM-PAC Score:  Score: 22  Approx Degree of Impairment: 25.8%  Standardized Score (AM-PAC Scale): 47.1  CMS Modifier (G-Code): CJ    FUNCTIONAL TRANSFER ASSESSMENT  Sit to Stand: Edge of Bed  Edge of Bed: Independent  Functional mobility activity with RW and SBA-CGA household distances    BED MOBILITY  Rolling: Modified Independent (log roll)  Supine to Sit : Modified Independent (educated on log roll for pain management)  Sit to Supine (OT): Modified Independent    BALANCE ASSESSMENT  Static Sitting: Independent  Static Standing: Stand-by Assist    FUNCTIONAL ADL ASSESSMENT  Grooming Standing: Stand-by Assist (simulated with functional reach)  LB Dressing Seated: Modified Independent (modified figure four for socks)  Toileting Standing: Supervision (simulated with functional reach, pt has ostomy for urine, reported able to manage)    Skilled Therapy Provided: RN approved session. Received patient in supine. Performed ADLs/functional mobility/transfers as stated above. Primarily limited by  abdominal pain.  Pt requesting that this OT provide update to her dtr-in-law, and pt able to dial her phone number independently, provided with update on functional status and recommended continuing outpatient therapy if pt continues to have decreased L shoulder ROM/pain. At the end of session, patient left in bed per request with needs met, and RN aware of session.    EDUCATION PROVIDED  Patient Education : Role of Occupational Therapy; Abdominal Protective Strategies; Plan of Care; Discharge Recommendations; DME Recommendations; Edema Reduction (modified techs for pain, ADL training)  Patient's Response to Education: Verbalized Understanding; Returned Demonstration    The patient's Approx Degree of Impairment: 25.8% has been calculated based on documentation in the [facility] '6 clicks' Inpatient Daily Activity Short Form.  Research supports that patients with this level of impairment may benefit from intermittent assist.  Final disposition will be made by interdisciplinary medical team.    Patient End of Session: In bed, Needs met, Call light within reach, RN aware of session/findings, All patient questions and concerns addressed    Patient was able to achieve the following ...   Patient able to toilet transfer  safely and SBA-independently    Patient able to dress lower extremities  safely and independently    Patient/Caregiver able to demonstrate safety with ADLS  safely and SBA-independently     Patient Evaluation Complexity Level:   Occupational Profile/Medical History LOW - Brief history including review of medical or therapy records    Specific performance deficits impacting engagement in ADL/IADL LOW  1 - 3 performance deficits    Client Assessment/Performance Deficits LOW - No comorbidities nor modifications of tasks    Clinical Decision Making LOW - Analysis of occupational profile, problem-focused assessments, limited treatment options    Overall Complexity LOW     OT Session Time: 30 minutes  Self-Care Home  Management: 8 minutes  Therapeutic Activity: 10 minutes  Evaluation    Any Hair, OTR/L

## 2025-05-30 NOTE — PLAN OF CARE
Pt is alert and oriented x4. On room air. Vital signs stable. Ambulating with stand by and walker. Advanced to full liquid diet. Tolerating well. Complains of pain, norco given. Continent of bowel and bladder. Urine collected for urinalysis. Safety measures in place. Will continue to monitor.     Problem: Patient Centered Care  Goal: Patient preferences are identified and integrated in the patient's plan of care  Description: Interventions:  - What would you like us to know as we care for you?   - Provide timely, complete, and accurate information to patient/family  - Incorporate patient and family knowledge, values, beliefs, and cultural backgrounds into the planning and delivery of care  - Encourage patient/family to participate in care and decision-making at the level they choose  - Honor patient and family perspectives and choices  Outcome: Progressing     Problem: Diabetes/Glucose Control  Goal: Glucose maintained within prescribed range  Description: INTERVENTIONS:  - Monitor Blood Glucose as ordered  - Assess for signs and symptoms of hyperglycemia and hypoglycemia  - Administer ordered medications to maintain glucose within target range  - Assess barriers to adequate nutritional intake and initiate nutrition consult as needed  - Instruct patient on self management of diabetes  Outcome: Progressing

## 2025-05-30 NOTE — PROGRESS NOTES
[facility]  part of [facility]    Progress Note    Michael Ibarra Patient Status:  Inpatient    1950 MRN [id number]   Location [facility] 4W/SW/SE Attending Jeffy Ibarra MD   Hosp Day # 2 PCP SOURAV FARR       Subjective:   Michael Ibarra is a(n) 75 year old female who I am seeing for CKD    No new issues  No shortness of breath      Objective:   /60 (BP Location: Right arm)   Pulse 56   Temp 98.3 °F (36.8 °C) (Axillary)   Resp 18   Ht 5' 5\" (1.651 m)   Wt 128 lb 15.5 oz (58.5 kg)   SpO2 98%   BMI 21.46 kg/m²      Intake/Output Summary (Last 24 hours) at 2025 1457  Last data filed at 2025 1120  Gross per 24 hour   Intake 835 ml   Output 1375 ml   Net -540 ml     Wt Readings from Last 1 Encounters:   25 128 lb 15.5 oz (58.5 kg)       Exam  Vital signs: Blood pressure 107/60, pulse 56, temperature 98.3 °F (36.8 °C), temperature source Axillary, resp. rate 18, height 5' 5\" (1.651 m), weight 128 lb 15.5 oz (58.5 kg), SpO2 98%.    General: No acute distress. Alert and oriented x 3.  HEENT: Moist mucous membranes. EOMI. PERRLA  Neck:  No JVD.   Respiratory: Clear to auscultation bilaterally.    Cardiovascular: S1, S2.  Regular rate and rhythm.   Abdomen: Soft, nontender, nondistended.    Neurologic: No focal neurological deficits.  Musculoskeletal: Full range of motion of all extremities.  No swelling noted.  Access:    Results:     Recent Labs   Lab 25  0825 25  0639   RBC 3.04* 3.00*   HGB 9.6* 9.4*   HCT 29.6* 29.1*   MCV 97.4 97.0   MCH 31.6 31.3   MCHC 32.4 32.3   RDW 12.7 12.8   NEPRELIM 6.24 4.63   WBC 7.8 6.2   .0 188.0         Recent Labs   Lab 25  0825 25  0639   * 118*   BUN 41* 37*   CREATSERUM 2.65* 2.48*   CA 8.8 8.7    137   K 4.6 4.6    109   CO2 19.0* 18.0*          No results found.    Assessment and Plan:     74 y/o F with h/o DM, CKD IV HLD,,  smoldering MM and sq cell ca of bladder s/p cystectomy with ileal conduit and history of hydronephrosis requiring b/l pcn and no improvement in renal fx and eventual removal [facility] for right lower quad pain. Ct  suggesting appendicitis but bc of ileal conduit urostomy was transferred to [place] bc of her surgeon  Also found to have UTI and getting treated for it   CT abd at outside hospital  Recurrent bilateral hydroureteronephrosis with ileal loop diversion right lower quadrant noted.     Impression:    JUAN MANUEL, mild, also with recurrent b/l hydro , urology to see and continue IVF   CKD IV- b/l cr 2.3-2.5 mg/dl . Thought sec to urinary reflux from ileal conduit. Follows with Dr Behara ([facility])   Acidosis sec to ileal conduit , on sodium bicarb  HTN- home meds  DM 2 ISS  H/o subcutaneous cell ca s/p cystectomy and ileal conduit July 2024   H/o b/l hydro and requiring b/l PCN and eventual removal   UTI , abx  Right lower quad pain, ct wih appendicitis. Surg eval conservative management   Sec hpth, phosp ok      Plan:    Kidney function is back to her recent baseline  Will change to bicarb drip and continue for another 24 hrs when improved PO intake  IV antibiotic both for UTI and appendicitis  CT scan at [facility]- ( care everywhere) Recurrent bilateral hydroureteronephrosis with ileal loop diversion right lower quadrant noted. Defer to urology whether plan for pcn . Prev had it and was removed. Regardless of b/l PCN in jan cr was 2.2-2.5 mg/dl range    Thank you very much for allowing me to participate in the care of your patient.  If you have any questions, please do not hesitate to contact me.     Hina Lainez MD  5/30/2025

## 2025-05-31 LAB
ALBUMIN SERPL-MCNC: 3.6 G/DL (ref 3.2–4.8)
ANION GAP SERPL CALC-SCNC: 12 MMOL/L (ref 0–18)
BASOPHILS # BLD AUTO: 0.03 X10(3) UL (ref 0–0.2)
BASOPHILS NFR BLD AUTO: 0.5 %
BUN BLD-MCNC: 35 MG/DL (ref 9–23)
BUN/CREAT SERPL: 14.6 (ref 10–20)
CALCIUM BLD-MCNC: 8.4 MG/DL (ref 8.7–10.4)
CHLORIDE SERPL-SCNC: 108 MMOL/L (ref 98–112)
CO2 SERPL-SCNC: 19 MMOL/L (ref 21–32)
CREAT BLD-MCNC: 2.39 MG/DL (ref 0.55–1.02)
DEPRECATED RDW RBC AUTO: 44.3 FL (ref 35.1–46.3)
EGFRCR SERPLBLD CKD-EPI 2021: 20 ML/MIN/1.73M2 (ref 60–?)
EOSINOPHIL # BLD AUTO: 0.21 X10(3) UL (ref 0–0.7)
EOSINOPHIL NFR BLD AUTO: 3.5 %
ERYTHROCYTE [DISTWIDTH] IN BLOOD BY AUTOMATED COUNT: 12.5 % (ref 11–15)
GLUCOSE BLD-MCNC: 95 MG/DL (ref 70–99)
GLUCOSE BLDC GLUCOMTR-MCNC: 164 MG/DL (ref 70–99)
GLUCOSE BLDC GLUCOMTR-MCNC: 253 MG/DL (ref 70–99)
GLUCOSE BLDC GLUCOMTR-MCNC: 315 MG/DL (ref 70–99)
GLUCOSE BLDC GLUCOMTR-MCNC: 85 MG/DL (ref 70–99)
HCT VFR BLD AUTO: 28.9 % (ref 35–48)
HGB BLD-MCNC: 9.4 G/DL (ref 12–16)
IMM GRANULOCYTES # BLD AUTO: 0.05 X10(3) UL (ref 0–1)
IMM GRANULOCYTES NFR BLD: 0.8 %
LYMPHOCYTES # BLD AUTO: 0.68 X10(3) UL (ref 1–4)
LYMPHOCYTES NFR BLD AUTO: 11.4 %
MAGNESIUM SERPL-MCNC: 1.8 MG/DL (ref 1.6–2.6)
MCH RBC QN AUTO: 31.6 PG (ref 26–34)
MCHC RBC AUTO-ENTMCNC: 32.5 G/DL (ref 31–37)
MCV RBC AUTO: 97.3 FL (ref 80–100)
MONOCYTES # BLD AUTO: 0.48 X10(3) UL (ref 0.1–1)
MONOCYTES NFR BLD AUTO: 8.1 %
NEUTROPHILS # BLD AUTO: 4.5 X10 (3) UL (ref 1.5–7.7)
NEUTROPHILS # BLD AUTO: 4.5 X10(3) UL (ref 1.5–7.7)
NEUTROPHILS NFR BLD AUTO: 75.7 %
OSMOLALITY SERPL CALC.SUM OF ELEC: 296 MOSM/KG (ref 275–295)
PHOSPHATE SERPL-MCNC: 4.6 MG/DL (ref 2.4–5.1)
PLATELET # BLD AUTO: 197 10(3)UL (ref 150–450)
POTASSIUM SERPL-SCNC: 4.7 MMOL/L (ref 3.5–5.1)
RBC # BLD AUTO: 2.97 X10(6)UL (ref 3.8–5.3)
SODIUM SERPL-SCNC: 139 MMOL/L (ref 136–145)
WBC # BLD AUTO: 6 X10(3) UL (ref 4–11)

## 2025-05-31 PROCEDURE — 99232 SBSQ HOSP IP/OBS MODERATE 35: CPT | Performed by: INTERNAL MEDICINE

## 2025-05-31 RX ORDER — SODIUM BICARBONATE 650 MG/1
650 TABLET ORAL ONCE
Status: COMPLETED | OUTPATIENT
Start: 2025-05-31 | End: 2025-05-31

## 2025-05-31 RX ORDER — MAGNESIUM OXIDE 400 MG/1
400 TABLET ORAL ONCE
Status: COMPLETED | OUTPATIENT
Start: 2025-05-31 | End: 2025-05-31

## 2025-05-31 NOTE — PLAN OF CARE
Patient has been ambulating w/ stand by assistance. No complaints of pain at this time. Tolerating soft/fiber diet for lunch. No nausea or vomiting reported. Has ileal conduit to RLQ. Is on IV sodium bicarb 75. Lovenox and scds at bedside for dvt prophylaxis. Daughter in law helping w/ translation over the phone.     Problem: Patient Centered Care  Goal: Patient preferences are identified and integrated in the patient's plan of care  Description: Interventions:  - What would you like us to know as we care for you? I feel better today   - Provide timely, complete, and accurate information to patient/family  - Incorporate patient and family knowledge, values, beliefs, and cultural backgrounds into the planning and delivery of care  - Encourage patient/family to participate in care and decision-making at the level they choose  - Honor patient and family perspectives and choices  Outcome: Progressing     Problem: Diabetes/Glucose Control  Goal: Glucose maintained within prescribed range  Description: INTERVENTIONS:  - Monitor Blood Glucose as ordered  - Assess for signs and symptoms of hyperglycemia and hypoglycemia  - Administer ordered medications to maintain glucose within target range  - Assess barriers to adequate nutritional intake and initiate nutrition consult as needed  - Instruct patient on self management of diabetes  Outcome: Progressing     Problem: Patient/Family Goals  Goal: Patient/Family Long Term Goal  Description: Patient's Long Term Goal: to not have abdominal pain     Interventions:  - diet  - IV abx  - See additional Care Plan goals for specific interventions  Outcome: Progressing  Goal: Patient/Family Short Term Goal  Description: Patient's Short Term Goal: to go home     Interventions:   - follow plan of care  - See additional Care Plan goals for specific interventions  Outcome: Progressing     Problem: RISK FOR INFECTION - ADULT  Goal: Absence of fever/infection during anticipated neutropenic  period  Description: INTERVENTIONS  - Monitor WBC  - Administer growth factors as ordered  - Implement neutropenic guidelines  Outcome: Progressing     Problem: SAFETY ADULT - FALL  Goal: Free from fall injury  Description: INTERVENTIONS:  - Assess pt frequently for physical needs  - Identify cognitive and physical deficits and behaviors that affect risk of falls.  - [place] fall precautions as indicated by assessment.  - Educate pt/family on patient safety including physical limitations  - Instruct pt to call for assistance with activity based on assessment  - Modify environment to reduce risk of injury  - Provide assistive devices as appropriate  - Consider OT/PT consult to assist with strengthening/mobility  - Encourage toileting schedule  Outcome: Progressing     Problem: DISCHARGE PLANNING  Goal: Discharge to home or other facility with appropriate resources  Description: INTERVENTIONS:  - Identify barriers to discharge w/pt and caregiver  - Include patient/family/discharge partner in discharge planning  - Arrange for needed discharge resources and transportation as appropriate  - Identify discharge learning needs (meds, wound care, etc)  - Arrange for interpreters to assist at discharge as needed  - Consider post-discharge preferences of patient/family/discharge partner  - Complete POLST form as appropriate  - Assess patient's ability to be responsible for managing their own health  - Refer to Case Management Department for coordinating discharge planning if the patient needs post-hospital services based on physician/LIP order or complex needs related to functional status, cognitive ability or social support system  Outcome: Progressing

## 2025-05-31 NOTE — PLAN OF CARE
Patient is alert and oriented. On room air. Patient tolerating full liquid diet. ACHS accuchecks. Norco prn given for pain. Patients moves standby assist with walker. Patient has a urostomy with output. Receiving IV fluids and antibiotics. Call light within reach. Safety measures in place.     Problem: Patient Centered Care  Goal: Patient preferences are identified and integrated in the patient's plan of care  Description: Interventions:  - What would you like us to know as we care for you? From home with family  - Provide timely, complete, and accurate information to patient/family  - Incorporate patient and family knowledge, values, beliefs, and cultural backgrounds into the planning and delivery of care  - Encourage patient/family to participate in care and decision-making at the level they choose  - Honor patient and family perspectives and choices  Outcome: Progressing     Problem: Diabetes/Glucose Control  Goal: Glucose maintained within prescribed range  Description: INTERVENTIONS:  - Monitor Blood Glucose as ordered  - Assess for signs and symptoms of hyperglycemia and hypoglycemia  - Administer ordered medications to maintain glucose within target range  - Assess barriers to adequate nutritional intake and initiate nutrition consult as needed  - Instruct patient on self management of diabetes  Outcome: Progressing     Problem: Patient/Family Goals  Goal: Patient/Family Long Term Goal  Description: Patient's Long Term Goal: To be discharged    Interventions:  - medications, diagnostics  - See additional Care Plan goals for specific interventions  Outcome: Progressing  Goal: Patient/Family Short Term Goal  Description: Patient's Short Term Goal: Resolve nausea    Interventions:   - Medications, diagnostics  - See additional Care Plan goals for specific interventions  Outcome: Progressing

## 2025-05-31 NOTE — PROGRESS NOTES
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      RLQ pain, improving, likely 2/2 appendicitis c/b UTI and ileal conduit urostomy   History of radical cystectomy, hysterectomy, and bilateral salpingo-oophorectomy due to squamous cell bladder CA   CT: Inflammatory changes in the right lower quadrant with findings raising concern for acute appendicitis. No abscess. Recurrent bilateral hydroureteronephrosis with ileal loop diversion right lower quadrant noted.  General surgery and Urology with recommendation for conservative management and gradual advancement of diet   Patient has done well with clears, advancing to general today   Patient reports significant improvement in pain today, and no nausea 5/31     Plan:   Continue conservative management  Advance diet   Acetaminophen, Norco, morphine for pain. Bowel regimen in place.   Consults: General Surgery, Urology, ID, Nephrology   Abx as below   Follow-up previous CT results      UTI  History of ESBL UTI and hydroureteronephrosis   Leukocytosis, improving  UA with positive nitrites and leuks, history of ESBL    Given Meropenem prior to transfer   CBC 5/28 without leukocytosis  Remains afebrile  ID recommending continued meropenem for GI and  coverage  Will discuss plans for discharge as patient is otherwise clinically improving. May need  PICC line which would take place Monday 6/2     JUAN MANUEL on CKD, likely 2/2 reflux from conduit and hydronephrosis  Anemia, chronic  Acidosis  Some improvement in Cr 5/30  On Iron PO therapy  Continue sodium bicarbonate      Plan:   Avoid nephrotoxic medications, titrate doses, when appropriate   Continue Bicarbonate drip given persistent acidosis  Appreciate nephrology recommendations     Chronic problems:     HTN - Continue Amlodipine      HLD - Hold statin      NIDDM   - Hold PO Medications,   - Correctional Algorithm  - Gabapentin for neuropathy       FN:  - IVF: Bicarb drip  - Diet: General      DVT Prophy:Held  Lines:PIV     Dispo: Likely DC in 1 to 2 days, may need PICC pending antibiotic selection.  Code status: Full    Questions/concerns were discussed with patient and/or family by bedside.    Total Time spent with patient and coordinating care:  >30 minutes    Thank You,  Jeffy Ibarra MD    Hospitalist with [facility]     Subjective:     Significant improvement in pain with no nausea, and reduced pain overall.  Patient states she feels comfortable advancing diet and has ordered general diet today.  Patient was wondering if she would be able to go home today.  Discussed need for antibiotic coverage and further monitoring at this time.  Patient is answered and discussed.    OBJECTIVE:    Blood pressure 132/73, pulse 72, temperature 98.8 °F (37.1 °C), temperature source Oral, resp. rate 17, height 5' 5\" (1.651 m), weight 128 lb 15.5 oz (58.5 kg), SpO2 95%.    Temp:  [98.1 °F (36.7 °C)-99 °F (37.2 °C)] 98.8 °F (37.1 °C)  Pulse:  [56-72] 72  Resp:  [17-18] 17  BP: (107-132)/(60-73) 132/73  SpO2:  [95 %-98 %] 95 %      Intake/Output:    Intake/Output Summary (Last 24 hours) at 5/31/2025 1018  Last data filed at 5/31/2025 0923  Gross per 24 hour   Intake 1235 ml   Output 2550 ml   Net -1315 ml       Last 3 Weights   05/28/25 2037 128 lb 15.5 oz (58.5 kg)   07/08/24 0626 123 lb (55.8 kg)   06/25/24 1207 130  lb (59 kg)   07/05/24 1304 120 lb (54.4 kg)       Exam   Physical Exam  Vitals reviewed.   Constitutional:       General: She is not in acute distress.     Appearance: Normal appearance. She is not ill-appearing, toxic-appearing or diaphoretic.      Comments: Patient is more well appearing than yesterday   Cardiovascular:      Rate and Rhythm: Normal rate and regular rhythm.      Pulses: Normal pulses.      Heart sounds: Normal heart sounds.   Pulmonary:      Effort: Pulmonary effort is normal.      Breath sounds: Normal breath sounds.   Abdominal:      Palpations: Abdomen is soft.      Tenderness: There is abdominal tenderness in the right lower quadrant and suprapubic area.      Comments: Continues to improve, but still present   Skin:     General: Skin is warm and dry.      Capillary Refill: Capillary refill takes less than 2 seconds.   Neurological:      General: No focal deficit present.      Mental Status: She is alert and oriented to person, place, and time. Mental status is at baseline.   Psychiatric:         Mood and Affect: Mood normal.         Behavior: Behavior normal.             Data Review:       Labs:     Recent Labs   Lab 05/29/25 0825 05/30/25 0639 05/31/25  0859   RBC 3.04* 3.00* 2.97*   HGB 9.6* 9.4* 9.4*   HCT 29.6* 29.1* 28.9*   MCV 97.4 97.0 97.3   MCH 31.6 31.3 31.6   MCHC 32.4 32.3 32.5   RDW 12.7 12.8 12.5   NEPRELIM 6.24 4.63 4.50   WBC 7.8 6.2 6.0   .0 188.0 197.0         Recent Labs   Lab 05/29/25 0825 05/30/25  0639 05/31/25  0859   * 118* 95   BUN 41* 37* 35*   CREATSERUM 2.65* 2.48* 2.39*   EGFRCR 18* 20* 20*   CA 8.8 8.7 8.4*    137 139   K 4.6 4.6 4.7    109 108   CO2 19.0* 18.0* 19.0*       Recent Labs   Lab 05/29/25 0825 05/30/25  0639 05/31/25  0859   ALT <7*  --   --    AST 9  --   --    ALB 3.8 3.8 3.6         Imaging:  No results found.      Meds:     Scheduled Medications[1]  Medication Infusions[2]  PRN Medications[3]           [1]    meropenem   500 mg Intravenous q12h    pantoprazole  40 mg Intravenous Q24H    enoxaparin  30 mg Subcutaneous Daily    amLODIPine  5 mg Oral Daily    ferrous sulfate  325 mg Oral BID    gabapentin  300 mg Oral Nightly    insulin aspart  1-7 Units Subcutaneous TID CC   [2]    sodium bicarbonate in 0.45% NS infusion 75 mL/hr at 05/31/25 0024   [3]   glucose **OR** glucose **OR** glucose-vitamin C **OR** dextrose **OR** glucose **OR** glucose **OR** glucose-vitamin C    acetaminophen **OR** HYDROcodone-acetaminophen **OR** HYDROcodone-acetaminophen    morphINE **OR** morphINE **OR** morphINE    polyethylene glycol (PEG 3350)    sennosides    bisacodyl    fleet enema    ondansetron    metoclopramide

## 2025-05-31 NOTE — PROGRESS NOTES
[facility]  Progress Note    Michael Ibarra Patient Status:  Inpatient    1950 MRN [id number]   Location [facility] 4W/SW/SE Attending Jeffy Ibarra MD   Hosp Day # 3 PCP SOURAV FARR     Subjective:   used upon this visit.  Patient resting comfortably in bed this morning.  Tolerating diet without nausea or emesis.  Reports passed flatus, denies bowel movement.  Denies fever or chills.  Reports mild abdominal pain, controlled with analgesics.  Ambulating around the room and to bathroom.    Objective/Physical Exam:  General: Alert, orientated x3.  Cooperative.  No apparent distress.  Vital Signs:  Blood pressure 120/64, pulse 60, temperature 98.9 °F (37.2 °C), temperature source Axillary, resp. rate 18, height 5' 5\" (1.651 m), weight 128 lb 15.5 oz (58.5 kg), SpO2 97%.  Wt Readings from Last 3 Encounters:   25 128 lb 15.5 oz (58.5 kg)   24 123 lb (55.8 kg)   24 120 lb (54.4 kg)     Lungs: No respiratory distress.  Cardiac: Regular rate and rhythm.   Abdomen:  Soft, non distended, non tender, with no rebound or guarding.  No peritoneal signs.  RLQ urostomy functioning  Extremities:  No lower extremity edema noted.      Intake/Output:    Intake/Output Summary (Last 24 hours) at 2025 1252  Last data filed at 2025 1226  Gross per 24 hour   Intake 1235 ml   Output 2700 ml   Net -1465 ml     I/O last 3 completed shifts:  In:  [P.O.:480; I.V.:1490; IV PIGGYBACK:100]  Out: 3125 [Urine:3125]  I/O this shift:  In: -   Out: 900 [Urine:900]    Medications: Scheduled Medications[1]    Labs:  Lab Results   Component Value Date    WBC 6.0 2025    HGB 9.4 2025    HCT 28.9 2025    .0 2025     Lab Results   Component Value Date     2025    K 4.7 2025     2025    CO2 19.0 2025    BUN 35 2025    CREATSERUM 2.39 2025    GLU 95 2025    CA 8.4 2025     ALB 3.6 05/31/2025     No results found for: \"PT\", \"INR\"      Assessment  Problem List[2]    Possible Appendicitis on CT at outside facility  ESBL e.coli UTI   Hx of squamous cell carcinoma of bladder s/p robotic cystectomy on  07/2024 with ileal conduit    Plan:  No acute surgical intervention is indicated at this time  Advance to soft diet today as tolerated  Patient is stable for discharge home from surgical standpoint once tolerating a diet.  Discharge on antibiotics as per ID  Awaiting CTAP scan from [facility]  Appendicitis and UTI managed with antibiotics by Infectious Disease  Analgesics as needed for pain control  Ambulate and up to chair   DVT prophylaxis with SCDs and lovenox  GI prophylaxis with protonix    Quality:  DVT Mechanical Prophylaxis:   SCDs, Early ambuation  DVT Pharmacologic Prophylaxis   Medication    enoxaparin (Lovenox) 30 MG/0.3ML SUBQ injection 30 mg                Code Status: Full Code  Ramos: No urinary catheter in place  Ramos Duration (in days):   Central line:    ANISHA: 6/1/2025        Bernice Salcedo PA-C  5/31/2025  12:52 PM               [1]    sodium bicarbonate  650 mg Oral Once    meropenem  500 mg Intravenous q12h    pantoprazole  40 mg Intravenous Q24H    enoxaparin  30 mg Subcutaneous Daily    amLODIPine  5 mg Oral Daily    ferrous sulfate  325 mg Oral BID    gabapentin  300 mg Oral Nightly    insulin aspart  1-7 Units Subcutaneous TID CC   [2]   Patient Active Problem List  Diagnosis    Ostomy nurse consultation    Bladder cancer (HCC)    Acute appendicitis    JUAN MANUEL (acute kidney injury)    CKD (chronic kidney disease) stage 4, GFR 15-29 ml/min ([facility])    Right lower quadrant abdominal pain    Urinary tract infection without hematuria

## 2025-05-31 NOTE — PROGRESS NOTES
[facility]  part of [facility] Infectious Disease Progress Note    Michael Ibarra Patient Status:  Inpatient    1950 MRN [id number]   Location [facility] 4W/SW/SE Attending Jeffy Ibarra MD   Hosp Day # 3 PCP SOURAV FARR     Subjective:  PT with no new complaints.     Objective:    Allergies:  Allergies[1]    Medications:  Current Hospital Medications[2]    Physical Exam:  General: Alert, orientated x3.  Cooperative.  No apparent distress.  Vital Signs:  Blood pressure 132/73, pulse 72, temperature 98.8 °F (37.1 °C), temperature source Oral, resp. rate 17, height 165.1 cm (5' 5\"), weight 128 lb 15.5 oz (58.5 kg), SpO2 95%.   Temp (24hrs), Av.6 °F (37 °C), Min:98.1 °F (36.7 °C), Max:99 °F (37.2 °C)      HEENT: Exam is unremarkable.  Without scleral icterus.  Mucous membranes are moist. PERRLA.  Oropharynx is clear.  Neck: No tenderness to palpitation.  Full range of motion to flexion and extension, lateral rotation and lateral flexion of cervical spine.  No JVD. Supple.   Lungs: Clear to auscultation bilaterally.  Cardiac: Regular rate and rhythm. No murmur.  Abdomen:  Soft, non-distended, non-tender, with no rebound or guarding.   Extremities:  No lower extremity edema noted.  Without clubbing or cyanosis.    Skin: Normal texture and turgor.  Neurologic: Cranial nerves are grossly intact.  Motor strength and sensory examination is grossly normal.  No focal neurologic deficit.    Labs:  Lab Results   Component Value Date    WBC 6.0 2025    HGB 9.4 2025    HCT 28.9 2025    .0 2025    CREATSERUM 2.39 2025    BUN 35 2025     2025    K 4.7 2025     2025    CO2 19.0 2025    GLU 95 2025    CA 8.4 2025    ALB 3.6 2025    MG 1.8 2025    PHOS 4.6 2025         Assessment/Plan:    1.  ESBL E coli UTI  -hx of bladder CA s/p urostomy  -CT with  bilateral hydronephrosis  -urology on consult  -on IV meropenem d#3  2.  Appendicitis  -surgery on consult, no surgical intervention at this time  -on abx above  3.  CKD  -nephrology on consult    If you have any questions or concerns please call [facility] Infectious Disease at [phone].     PITA Balbuena         [1] No Known Allergies  [2]   Current Facility-Administered Medications:     meropenem (Merrem) 500 mg in sodium chloride 0.9% 100mL IVPB-NAJMA, 500 mg, Intravenous, q12h    pantoprazole (Protonix) 40 mg in sodium chloride 0.9% PF 10 mL IV push, 40 mg, Intravenous, Q24H    enoxaparin (Lovenox) 30 MG/0.3ML SUBQ injection 30 mg, 30 mg, Subcutaneous, Daily    sodium bicarbonate 75 mEq in sodium chloride 0.45% 1,075 mL infusion, 75 mEq, Intravenous, Continuous    amLODIPine (Norvasc) tab 5 mg, 5 mg, Oral, Daily    ferrous sulfate DR tab 325 mg, 325 mg, Oral, BID    gabapentin (Neurontin) cap 300 mg, 300 mg, Oral, Nightly    glucose (Dex4) 15 GM/59ML oral liquid 15 g, 15 g, Oral, Q15 Min PRN **OR** glucose (Glutose) 40% oral gel 15 g, 15 g, Oral, Q15 Min PRN **OR** glucose-vitamin C (Dex-4) chewable tab 4 tablet, 4 tablet, Oral, Q15 Min PRN **OR** dextrose 50% injection 50 mL, 50 mL, Intravenous, Q15 Min PRN **OR** glucose (Dex4) 15 GM/59ML oral liquid 30 g, 30 g, Oral, Q15 Min PRN **OR** glucose (Glutose) 40% oral gel 30 g, 30 g, Oral, Q15 Min PRN **OR** glucose-vitamin C (Dex-4) chewable tab 8 tablet, 8 tablet, Oral, Q15 Min PRN    insulin aspart (NovoLOG) 100 Units/mL FlexPen 1-7 Units, 1-7 Units, Subcutaneous, TID CC    acetaminophen (Tylenol) tab 650 mg, 650 mg, Oral, Q4H PRN **OR** HYDROcodone-acetaminophen (Norco) 5-325 MG per tab 1 tablet, 1 tablet, Oral, Q4H PRN **OR** HYDROcodone-acetaminophen (Norco) 5-325 MG per tab 2 tablet, 2 tablet, Oral, Q4H PRN    morphINE PF 2 MG/ML injection 1 mg, 1 mg, Intravenous, Q2H PRN **OR** morphINE PF 2 MG/ML injection 2 mg, 2 mg, Intravenous, Q2H PRN  **OR** morphINE PF 4 MG/ML injection 4 mg, 4 mg, Intravenous, Q2H PRN    polyethylene glycol (PEG 3350) (Miralax) 17 g oral packet 17 g, 17 g, Oral, Daily PRN    sennosides (Senokot) tab 17.2 mg, 17.2 mg, Oral, Nightly PRN    bisacodyl (Dulcolax) 10 MG rectal suppository 10 mg, 10 mg, Rectal, Daily PRN    fleet enema (Fleet) rectal enema 133 mL, 1 enema, Rectal, Once PRN    ondansetron (Zofran) 4 MG/2ML injection 4 mg, 4 mg, Intravenous, Q6H PRN    metoclopramide (Reglan) 5 mg/mL injection 10 mg, 10 mg, Intravenous, Q8H PRN

## 2025-05-31 NOTE — PROGRESS NOTES
Assumed care of patient at 1500. Alert and oriented X4. Room air. Voiding freely per ileal conduit. Scheduled medications given. No distress noted. Call light is within reach. Fall precautions in place. Plan for discharge home when cleared.

## 2025-05-31 NOTE — PROGRESS NOTES
[facility]  part of [facility]    Progress Note    Michael Ibarra Patient Status:  Inpatient    1950 MRN [id number]   Location [facility] 4W/SW/SE Attending Jeffy Ibarra MD   Hosp Day # 3 PCP SOURAV FARR       Subjective:   Michael Ibarra is a(n) 75 year old female who I am seeing for CKD    No new issues  No shortness of breath  No vomiting overnight    Objective:   /64 (BP Location: Right arm)   Pulse 60   Temp 98.9 °F (37.2 °C) (Axillary)   Resp 18   Ht 5' 5\" (1.651 m)   Wt 128 lb 15.5 oz (58.5 kg)   SpO2 97%   BMI 21.46 kg/m²      Intake/Output Summary (Last 24 hours) at 2025 1200  Last data filed at 2025 1127  Gross per 24 hour   Intake 1235 ml   Output 2450 ml   Net -1215 ml     Wt Readings from Last 1 Encounters:   25 128 lb 15.5 oz (58.5 kg)       Exam  Vital signs: Blood pressure 120/64, pulse 60, temperature 98.9 °F (37.2 °C), temperature source Axillary, resp. rate 18, height 5' 5\" (1.651 m), weight 128 lb 15.5 oz (58.5 kg), SpO2 97%.    General: No acute distress. Alert and oriented x 3.  HEENT: Moist mucous membranes. EOMI. PERRLA  Neck:  No JVD.   Respiratory: Clear to auscultation bilaterally.    Cardiovascular: S1, S2.  Regular rate and rhythm.   Abdomen: Soft, nontender, nondistended.    Neurologic: No focal neurological deficits.  Musculoskeletal: Full range of motion of all extremities.  No swelling noted.  Access:    Results:     Recent Labs   Lab 25  0825 25  0639 25  0859   RBC 3.04* 3.00* 2.97*   HGB 9.6* 9.4* 9.4*   HCT 29.6* 29.1* 28.9*   MCV 97.4 97.0 97.3   MCH 31.6 31.3 31.6   MCHC 32.4 32.3 32.5   RDW 12.7 12.8 12.5   NEPRELIM 6.24 4.63 4.50   WBC 7.8 6.2 6.0   .0 188.0 197.0         Recent Labs   Lab 25  0825 25  0639 25  0859   * 118* 95   BUN 41* 37* 35*   CREATSERUM 2.65* 2.48* 2.39*   CA 8.8 8.7 8.4*    137 139   K 4.6  4.6 4.7    109 108   CO2 19.0* 18.0* 19.0*          No results found.    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 Ct  suggesting appendicitis but bc of ileal conduit urostomy was transferred to [place] bc of her surgeon  Also found to have UTI and getting treated for it   CT abd at outside hospital  Recurrent bilateral hydroureteronephrosis with ileal loop diversion right lower quadrant noted.     Impression:    JUAN MANUEL, mild, also with recurrent b/l hydro , urology to see and continue IVF   CKD IV- b/l cr 2.3-2.5 mg/dl . Thought sec to urinary reflux from ileal conduit. Follows with Dr Behara ([facility])   Acidosis sec to ileal conduit , on sodium bicarb  HTN- home meds  DM 2 ISS  H/o squamous cell ca of bladder s/p cystectomy and ileal conduit July 2024   H/o b/l hydro and requiring b/l PCN and eventual removal   UTI , abx  Right lower quad pain, ct wih appendicitis. Surg eval conservative management   Sec hpth, phosp ok      Plan:    Kidney function is back to her recent baseline  Will change to bicarb drip and continue for another 24 hrs when improved PO intake  IV antibiotic both for UTI and appendicitis  CT scan at [facility]- ( care everywhere) Recurrent bilateral hydroureteronephrosis with ileal loop diversion right lower quadrant noted. Defer to urology whether plan for pcn . Prev had it and was removed. Regardless of b/l PCN in jan cr was 2.2-2.5 mg/dl range.  Can DC IV fluids when taking p.o.  Will give a dose of sodium bicarb      Kidney function is stable and at baseline.  Will see her again on Monday.  Please call if any questions.    Thank you very much for allowing me to participate in the care of your patient.  If you have any questions, please do not hesitate to  contact me.     Hina Lainez MD

## 2025-06-01 LAB
ALBUMIN SERPL-MCNC: 3.5 G/DL (ref 3.2–4.8)
ANION GAP SERPL CALC-SCNC: 9 MMOL/L (ref 0–18)
BUN BLD-MCNC: 33 MG/DL (ref 9–23)
BUN/CREAT SERPL: 16 (ref 10–20)
CALCIUM BLD-MCNC: 8.1 MG/DL (ref 8.7–10.4)
CHLORIDE SERPL-SCNC: 109 MMOL/L (ref 98–112)
CO2 SERPL-SCNC: 23 MMOL/L (ref 21–32)
CREAT BLD-MCNC: 2.06 MG/DL (ref 0.55–1.02)
EGFRCR SERPLBLD CKD-EPI 2021: 24 ML/MIN/1.73M2 (ref 60–?)
GLUCOSE BLD-MCNC: 133 MG/DL (ref 70–99)
GLUCOSE BLDC GLUCOMTR-MCNC: 143 MG/DL (ref 70–99)
GLUCOSE BLDC GLUCOMTR-MCNC: 160 MG/DL (ref 70–99)
GLUCOSE BLDC GLUCOMTR-MCNC: 332 MG/DL (ref 70–99)
GLUCOSE BLDC GLUCOMTR-MCNC: 344 MG/DL (ref 70–99)
GLUCOSE BLDC GLUCOMTR-MCNC: 420 MG/DL (ref 70–99)
MAGNESIUM SERPL-MCNC: 1.8 MG/DL (ref 1.6–2.6)
OSMOLALITY SERPL CALC.SUM OF ELEC: 301 MOSM/KG (ref 275–295)
PHOSPHATE SERPL-MCNC: 4 MG/DL (ref 2.4–5.1)
POTASSIUM SERPL-SCNC: 4.2 MMOL/L (ref 3.5–5.1)
SODIUM SERPL-SCNC: 141 MMOL/L (ref 136–145)

## 2025-06-01 PROCEDURE — 99233 SBSQ HOSP IP/OBS HIGH 50: CPT | Performed by: INTERNAL MEDICINE

## 2025-06-01 RX ORDER — MAGNESIUM OXIDE 400 MG/1
400 TABLET ORAL ONCE
Status: COMPLETED | OUTPATIENT
Start: 2025-06-01 | End: 2025-06-01

## 2025-06-01 RX ORDER — FLUCONAZOLE 150 MG/1
150 TABLET ORAL ONCE
Status: COMPLETED | OUTPATIENT
Start: 2025-06-01 | End: 2025-06-01

## 2025-06-01 NOTE — PROGRESS NOTES
[facility]  part of [facility]    Progress Note    Michael Ibarra Patient Status:  Inpatient    1950 MRN [id number]   Location [facility] 4W/SW/SE Attending Jeffy Ibarra MD   Hosp Day # 4 PCP SOURAV FARR       Subjective:   Michael Ibarra is a(n) 75 year old female who I am seeing for CKD    No new issues  No shortness of breath  No vomiting overnight  Clear urine in the bag    Objective:   /81 (BP Location: Right arm)   Pulse 67   Temp 98 °F (36.7 °C) (Oral)   Resp 18   Ht 5' 5\" (1.651 m)   Wt 128 lb 15.5 oz (58.5 kg)   SpO2 96%   BMI 21.46 kg/m²      Intake/Output Summary (Last 24 hours) at 2025 1538  Last data filed at 2025 1353  Gross per 24 hour   Intake 1348 ml   Output 2900 ml   Net -1552 ml     Wt Readings from Last 1 Encounters:   25 128 lb 15.5 oz (58.5 kg)       Exam  Vital signs: Blood pressure 135/81, pulse 67, temperature 98 °F (36.7 °C), temperature source Oral, resp. rate 18, height 5' 5\" (1.651 m), weight 128 lb 15.5 oz (58.5 kg), SpO2 96%.    General: No acute distress. Alert and oriented x 3.  HEENT: Moist mucous membranes. EOMI. PERRLA  Neck:  No JVD.   Respiratory: Clear to auscultation bilaterally.    Cardiovascular: S1, S2.  Regular rate and rhythm.   Abdomen: Soft, nontender, nondistended.    Neurologic: No focal neurological deficits.  Musculoskeletal: Full range of motion of all extremities.  No swelling noted.  Access:    Results:     Recent Labs   Lab 25  0825 25  0639 25  0859   RBC 3.04* 3.00* 2.97*   HGB 9.6* 9.4* 9.4*   HCT 29.6* 29.1* 28.9*   MCV 97.4 97.0 97.3   MCH 31.6 31.3 31.6   MCHC 32.4 32.3 32.5   RDW 12.7 12.8 12.5   NEPRELIM 6.24 4.63 4.50   WBC 7.8 6.2 6.0   .0 188.0 197.0         Recent Labs   Lab 25  0639 25  0859 25  0643   * 95 133*   BUN 37* 35* 33*   CREATSERUM 2.48* 2.39* 2.06*   CA 8.7 8.4* 8.1*    139  141   K 4.6 4.7 4.2    108 109   CO2 18.0* 19.0* 23.0          No results found.    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 Ct  suggesting appendicitis but bc of ileal conduit urostomy was transferred to [place] bc of her surgeon  Also found to have UTI and getting treated for it   CT abd at outside hospital  Recurrent bilateral hydroureteronephrosis with ileal loop diversion right lower quadrant noted.     Impression:    JUAN MANUEL, mild, also with recurrent b/l hydro: Recurrence of bilateral hydronephrosis.  Urology on board  CKD IV- b/l cr 2.3-2.5 mg/dl . Thought sec to urinary reflux from ileal conduit. Follows with Dr Behara (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 Surg eval conservative management   Sec hpth, phosp ok      Plan:    Kidney function continues to trend down - monitor  Serum bicarb within normal limits today.  Off of bicarb drip  IV antibiotic both for UTI and appendicitis  CT scan at [facility]- ( care everywhere) Recurrent bilateral hydroureteronephrosis with ileal loop diversion right lower quadrant noted. Defer to urology whether plan for pcn . Prev had it and was removed. Regardless of b/l PCN in jan cr was 2.2-2.5 mg/dl range.    Discussed with nursing.  Surgical input noted      Evan Resendiz MD

## 2025-06-01 NOTE — PLAN OF CARE
Patient has been ambulating w/ stand by assistance. No complaints of pain at this time. Tolerating soft/fiber diet. No nausea or vomiting reported. Has ileal conduit to RLQ. Is on IV sodium bicarb 75. Lovenox and scds at bedside for dvt prophylaxis. Daughter in law helping w/ translation over the phone.       Problem: Patient Centered Care  Goal: Patient preferences are identified and integrated in the patient's plan of care  Description: Interventions:  - What would you like us to know as we care for you? I have a daughter  - Provide timely, complete, and accurate information to patient/family  - Incorporate patient and family knowledge, values, beliefs, and cultural backgrounds into the planning and delivery of care  - Encourage patient/family to participate in care and decision-making at the level they choose  - Honor patient and family perspectives and choices  Outcome: Progressing     Problem: Diabetes/Glucose Control  Goal: Glucose maintained within prescribed range  Description: INTERVENTIONS:  - Monitor Blood Glucose as ordered  - Assess for signs and symptoms of hyperglycemia and hypoglycemia  - Administer ordered medications to maintain glucose within target range  - Assess barriers to adequate nutritional intake and initiate nutrition consult as needed  - Instruct patient on self management of diabetes  Outcome: Progressing     Problem: Patient/Family Goals  Goal: Patient/Family Long Term Goal  Description: Patient's Long Term Goal: to no longer have infection    Interventions:  - IV abx  - diet   - See additional Care Plan goals for specific interventions  Outcome: Progressing  Goal: Patient/Family Short Term Goal  Description: Patient's Short Term Goal: to go home     Interventions:   - follow plan of care  - See additional Care Plan goals for specific interventions  Outcome: Progressing     Problem: RISK FOR INFECTION - ADULT  Goal: Absence of fever/infection during anticipated neutropenic  period  Description: INTERVENTIONS  - Monitor WBC  - Administer growth factors as ordered  - Implement neutropenic guidelines  Outcome: Progressing     Problem: SAFETY ADULT - FALL  Goal: Free from fall injury  Description: INTERVENTIONS:  - Assess pt frequently for physical needs  - Identify cognitive and physical deficits and behaviors that affect risk of falls.  - [place] fall precautions as indicated by assessment.  - Educate pt/family on patient safety including physical limitations  - Instruct pt to call for assistance with activity based on assessment  - Modify environment to reduce risk of injury  - Provide assistive devices as appropriate  - Consider OT/PT consult to assist with strengthening/mobility  - Encourage toileting schedule  Outcome: Progressing     Problem: DISCHARGE PLANNING  Goal: Discharge to home or other facility with appropriate resources  Description: INTERVENTIONS:  - Identify barriers to discharge w/pt and caregiver  - Include patient/family/discharge partner in discharge planning  - Arrange for needed discharge resources and transportation as appropriate  - Identify discharge learning needs (meds, wound care, etc)  - Arrange for interpreters to assist at discharge as needed  - Consider post-discharge preferences of patient/family/discharge partner  - Complete POLST form as appropriate  - Assess patient's ability to be responsible for managing their own health  - Refer to Case Management Department for coordinating discharge planning if the patient needs post-hospital services based on physician/LIP order or complex needs related to functional status, cognitive ability or social support system  Outcome: Progressing

## 2025-06-01 NOTE — PROGRESS NOTES
[facility]  part of [facility] Infectious Disease Progress Note    Michael Ibarra Patient Status:  Inpatient    1950 MRN [id number]   Location [facility] 4W/SW/SE Attending Jeffy Ibarra MD   Hosp Day # 4 PCP SOURAV FARR     Subjective:  Pt sleeping comfortably, NAD.    Objective:    Allergies:  Allergies[1]    Medications:  Current Hospital Medications[2]    Physical Exam:  General: Sleeping.  No apparent distress.  Vital Signs:  Blood pressure 115/56, pulse 84, temperature 98.2 °F (36.8 °C), temperature source Oral, resp. rate 18, height 165.1 cm (5' 5\"), weight 128 lb 15.5 oz (58.5 kg), SpO2 95%.   Temp (24hrs), Av.4 °F (36.9 °C), Min:98 °F (36.7 °C), Max:98.9 °F (37.2 °C)      HEENT: Exam is unremarkable.  Without scleral icterus.  Mucous membranes are moist. PERRLA.  Oropharynx is clear.  Neck: No tenderness to palpitation.  Full range of motion to flexion and extension, lateral rotation and lateral flexion of cervical spine.  No JVD. Supple.   Lungs: Clear to auscultation bilaterally.  Cardiac: Regular rate and rhythm. No murmur.  Abdomen:  Soft, non-distended, non-tender, with no rebound or guarding.   Extremities:  No lower extremity edema noted.  Without clubbing or cyanosis.    Skin: Normal texture and turgor.  Neurologic: Cranial nerves are grossly intact.  Motor strength and sensory examination is grossly normal.  No focal neurologic deficit.    Labs:  Lab Results   Component Value Date    CREATSERUM 2.06 2025    BUN 33 2025     2025    K 4.2 2025     2025    CO2 23.0 2025     2025    CA 8.1 2025    ALB 3.5 2025    MG 1.8 2025    PHOS 4.0 2025         Assessment/Plan:    1.  ESBL E coli UTI  -cultures at [facility]  -hx of bladder CA s/p urostomy  -CT with bilateral hydronephrosis  -urology on consult  -on IV meropenem d#4  2.  Appendicitis  -surgery on  consult, no surgical intervention at this time  -on abx above  3.  CKD  -nephrology on consult    If you have any questions or concerns please call [facility] Infectious Disease at [phone].     PITA Balbuena         [1] No Known Allergies  [2]   Current Facility-Administered Medications:     meropenem (Merrem) 500 mg in sodium chloride 0.9% 100mL IVPB-NAJMA, 500 mg, Intravenous, q12h    pantoprazole (Protonix) 40 mg in sodium chloride 0.9% PF 10 mL IV push, 40 mg, Intravenous, Q24H    enoxaparin (Lovenox) 30 MG/0.3ML SUBQ injection 30 mg, 30 mg, Subcutaneous, Daily    sodium bicarbonate 75 mEq in sodium chloride 0.45% 1,075 mL infusion, 75 mEq, Intravenous, Continuous    amLODIPine (Norvasc) tab 5 mg, 5 mg, Oral, Daily    ferrous sulfate DR tab 325 mg, 325 mg, Oral, BID    gabapentin (Neurontin) cap 300 mg, 300 mg, Oral, Nightly    glucose (Dex4) 15 GM/59ML oral liquid 15 g, 15 g, Oral, Q15 Min PRN **OR** glucose (Glutose) 40% oral gel 15 g, 15 g, Oral, Q15 Min PRN **OR** glucose-vitamin C (Dex-4) chewable tab 4 tablet, 4 tablet, Oral, Q15 Min PRN **OR** dextrose 50% injection 50 mL, 50 mL, Intravenous, Q15 Min PRN **OR** glucose (Dex4) 15 GM/59ML oral liquid 30 g, 30 g, Oral, Q15 Min PRN **OR** glucose (Glutose) 40% oral gel 30 g, 30 g, Oral, Q15 Min PRN **OR** glucose-vitamin C (Dex-4) chewable tab 8 tablet, 8 tablet, Oral, Q15 Min PRN    insulin aspart (NovoLOG) 100 Units/mL FlexPen 1-7 Units, 1-7 Units, Subcutaneous, TID CC    acetaminophen (Tylenol) tab 650 mg, 650 mg, Oral, Q4H PRN **OR** HYDROcodone-acetaminophen (Norco) 5-325 MG per tab 1 tablet, 1 tablet, Oral, Q4H PRN **OR** HYDROcodone-acetaminophen (Norco) 5-325 MG per tab 2 tablet, 2 tablet, Oral, Q4H PRN    morphINE PF 2 MG/ML injection 1 mg, 1 mg, Intravenous, Q2H PRN **OR** morphINE PF 2 MG/ML injection 2 mg, 2 mg, Intravenous, Q2H PRN **OR** morphINE PF 4 MG/ML injection 4 mg, 4 mg, Intravenous, Q2H PRN    polyethylene glycol (PEG  3350) (Miralax) 17 g oral packet 17 g, 17 g, Oral, Daily PRN    sennosides (Senokot) tab 17.2 mg, 17.2 mg, Oral, Nightly PRN    bisacodyl (Dulcolax) 10 MG rectal suppository 10 mg, 10 mg, Rectal, Daily PRN    fleet enema (Fleet) rectal enema 133 mL, 1 enema, Rectal, Once PRN    ondansetron (Zofran) 4 MG/2ML injection 4 mg, 4 mg, Intravenous, Q6H PRN    metoclopramide (Reglan) 5 mg/mL injection 10 mg, 10 mg, Intravenous, Q8H PRN

## 2025-06-01 NOTE — PROGRESS NOTES
[facility]  Progress Note    Michael Ibarra Patient Status:  Inpatient    1950 MRN [id number]   Location [facility] 4W/SW/SE Attending Jeffy Ibarra MD   Hosp Day # 4 PCP SOURAV FARR     Subjective:   used for examination.  Patient sitting in chair upon examination.  Patient reports abdominal pain is much improved since yesterday, denies any pain or cramping this morning.  Tolerating diet without nausea or emesis.  Notes she had a bowel movement yesterday, passed flatus today.  Ambulating.    Objective/Physical Exam:  General: Alert, orientated x3.  Cooperative.  No apparent distress.  Vital Signs:  Blood pressure 135/81, pulse 67, temperature 98 °F (36.7 °C), temperature source Oral, resp. rate 18, height 5' 5\" (1.651 m), weight 128 lb 15.5 oz (58.5 kg), SpO2 96%.  Wt Readings from Last 3 Encounters:   25 128 lb 15.5 oz (58.5 kg)   24 123 lb (55.8 kg)   24 120 lb (54.4 kg)     Lungs: No respiratory distress.  Cardiac: Regular rate and rhythm.   Abdomen:  Soft, non distended, non tender, with no rebound or guarding.  No peritoneal signs.  RLQ urostomy bag with pale yellow urine  Extremities:  No lower extremity edema noted.      Intake/Output:    Intake/Output Summary (Last 24 hours) at 2025 1125  Last data filed at 2025 1005  Gross per 24 hour   Intake 1108 ml   Output 3150 ml   Net -2042 ml     I/O last 3 completed shifts:  In: 2343 [P.O.:960; I.V.:1183; IV PIGGYBACK:200]  Out: 4350 [Urine:4350]  I/O this shift:  In: -   Out: 550 [Urine:550]    Medications: Scheduled Medications[1]    Labs:     Lab Results   Component Value Date     2025    K 4.2 2025     2025    CO2 23.0 2025    BUN 33 2025    CREATSERUM 2.06 2025     2025    CA 8.1 2025    ALB 3.5 2025     No results found for: \"PT\", \"INR\"      Assessment  Problem List[2]    Possible Appendicitis  on CT at outside facility  ESBL e.coli UTI   Hx of squamous cell carcinoma of bladder s/p robotic cystectomy on  07/2024 with ileal conduit     Plan:  Patient is stable for discharge home from surgical standpoint as pain is controlled and tolerating diet.  Discharge on PO antibiotics as per ID  Analgesics as needed for pain control  Continue soft diet as tolerated  Ambulate and up to chair   DVT prophylaxis with SCDs and lovenox  GI prophylaxis with protonix    Quality:  DVT Mechanical Prophylaxis:   SCDs, Early ambuation  DVT Pharmacologic Prophylaxis   Medication    enoxaparin (Lovenox) 30 MG/0.3ML SUBQ injection 30 mg                Code Status: Full Code  Ramos: No urinary catheter in place  Ramos Duration (in days):   Central line:    ANISHA: 6/1/2025        Bernice Salcedo PA-C  6/1/2025  11:25 AM               [1]    meropenem  500 mg Intravenous q12h    pantoprazole  40 mg Intravenous Q24H    enoxaparin  30 mg Subcutaneous Daily    amLODIPine  5 mg Oral Daily    ferrous sulfate  325 mg Oral BID    gabapentin  300 mg Oral Nightly    insulin aspart  1-7 Units Subcutaneous TID CC   [2]   Patient Active Problem List  Diagnosis    Ostomy nurse consultation    Bladder cancer (HCC)    Acute appendicitis    JUAN MANUEL (acute kidney injury)    CKD (chronic kidney disease) stage 4, GFR 15-29 ml/min ([facility])    Right lower quadrant abdominal pain    Urinary tract infection without hematuria

## 2025-06-01 NOTE — PROGRESS NOTES
[facility] Hospitalist Progress Note     CC: Hospital Follow up    PCP: SOURAV FARR       Assessment/Plan:     Active Problems:    Acute appendicitis    JUAN MANUEL (acute kidney injury)    CKD (chronic kidney disease) stage 4, GFR 15-29 ml/min (HCC)    Right lower quadrant abdominal pain    Urinary tract infection without hematuria    75 year old female with HTN, HLD, NIDDM, CKD, Multiple Myeloma, and squamous cell bladder CA s/p cystectomy with ileal conduit and history of hydronephrosis and ESBL UTI and found to have an acute UTI started on meropenem 5/29 who was transferred to [facility] for management of possible appendicitis c/b ileal conduit urostomy.      RLQ pain, improving, likely 2/2 appendicitis c/b UTI and ileal conduit urostomy   History of radical cystectomy, hysterectomy, and bilateral salpingo-oophorectomy due to squamous cell bladder CA   CT: Inflammatory changes in the right lower quadrant with findings raising concern for acute appendicitis. No abscess. Recurrent bilateral hydroureteronephrosis with ileal loop diversion right lower quadrant noted.  General surgery and Urology with recommendation for conservative management and gradual advancement of diet   Patient has done well with clears, advancing to general today   Patient reports significant improvement in pain today, and no nausea 5/31     Plan:   Patient clear for discharge from surgical standpoint  Acetaminophen, Norco, morphine for pain. Bowel regimen in place.   Consults: General Surgery, Urology, ID, Nephrology   Abx as below      UTI  History of ESBL UTI and hydroureteronephrosis   Leukocytosis, improving  UA with positive nitrites and leuks, history of ESBL    Given Meropenem prior to transfer   CBC 5/28 without leukocytosis  Remains afebrile  ID recommending continued meropenem for GI and  coverage  Will discuss plans for discharge as patient is otherwise clinically improving. May need PICC line which would take  place Monday 6/2    Vaginal discharge  Patient's chronic vaginal discharge over the past 3 months with white cottage cheeselike appearance  Swab sent for concerns for Laila  Will empirically treat with fluconazole for now    JUAN MANUEL on CKD, likely 2/2 reflux from conduit and hydronephrosis  Anemia, chronic  Acidosis  Gradual improvement in Cr 6/1  On Iron PO therapy  Sodium bicarb within normal limits today.      Plan:   Avoid nephrotoxic medications, titrate doses, when appropriate   Discontinue sodium bicarb drip  Appreciate nephrology recommendations, continue to monitor     Chronic problems:     HTN - Continue Amlodipine      HLD - Hold statin      NIDDM   - Hold PO Medications,   - Correctional Algorithm  - Gabapentin for neuropathy       FN:  - IVF: Bicarb drip  - Diet: General      DVT Prophy:Held  Lines:PIV     Dispo: Likely DC in 1 to 2 days, may need PICC pending antibiotic selection.  Code status: Full    Questions/concerns were discussed with patient and/or family by bedside.    Total Time spent with patient and coordinating care:  >30 minutes    Thank You,  Jeffy Ibarra MD    Hospitalist with [facility]     Subjective:     Patient with significant improvement, consistent with yesterday.  Discussed history of vaginal discharge, she is like for past few months, present today as well.  Discussed likely need for PICC line on Monday.  All questions answered and discussed    OBJECTIVE:    Blood pressure 131/75, pulse 58, temperature 98 °F (36.7 °C), temperature source Oral, resp. rate 16, height 5' 5\" (1.651 m), weight 128 lb 15.5 oz (58.5 kg), SpO2 95%.    Temp:  [98 °F (36.7 °C)-98.9 °F (37.2 °C)] 98 °F (36.7 °C)  Pulse:  [58-72] 58  Resp:  [16-18] 16  BP: (115-132)/(63-87) 131/75  SpO2:  [94 %-98 %] 95 %      Intake/Output:    Intake/Output Summary (Last 24 hours) at 6/1/2025 0826  Last data filed at 6/1/2025 0604  Gross per 24 hour   Intake 1108 ml   Output 2950 ml   Net -1842 ml       Last 3  Weights   05/28/25 2037 128 lb 15.5 oz (58.5 kg)   07/08/24 0626 123 lb (55.8 kg)   06/25/24 1207 130 lb (59 kg)   07/05/24 1304 120 lb (54.4 kg)       Exam   Physical Exam  Vitals reviewed. Exam conducted with a chaperone present.   Constitutional:       General: She is not in acute distress.     Appearance: Normal appearance. She is not ill-appearing, toxic-appearing or diaphoretic.      Comments: Patient is more well appearing than yesterday   Cardiovascular:      Rate and Rhythm: Normal rate and regular rhythm.      Pulses: Normal pulses.      Heart sounds: Normal heart sounds.   Pulmonary:      Effort: Pulmonary effort is normal.      Breath sounds: Normal breath sounds.   Abdominal:      Palpations: Abdomen is soft.      Tenderness: There is no abdominal tenderness.      Comments: Continues to improve, but still present   Genitourinary:     General: Normal vulva.      Comments: Some white coat cheeselike vaginal discharge.  Swabbed.  Sent to lab.  Chaperone present.  Skin:     General: Skin is warm and dry.      Capillary Refill: Capillary refill takes less than 2 seconds.   Neurological:      General: No focal deficit present.      Mental Status: She is alert and oriented to person, place, and time. Mental status is at baseline.   Psychiatric:         Mood and Affect: Mood normal.         Behavior: Behavior normal.             Data Review:       Labs:     Recent Labs   Lab 05/29/25  0825 05/30/25  0639 05/31/25  0859   RBC 3.04* 3.00* 2.97*   HGB 9.6* 9.4* 9.4*   HCT 29.6* 29.1* 28.9*   MCV 97.4 97.0 97.3   MCH 31.6 31.3 31.6   MCHC 32.4 32.3 32.5   RDW 12.7 12.8 12.5   NEPRELIM 6.24 4.63 4.50   WBC 7.8 6.2 6.0   .0 188.0 197.0         Recent Labs   Lab 05/30/25  0639 05/31/25  0859 06/01/25  0643   * 95 133*   BUN 37* 35* 33*   CREATSERUM 2.48* 2.39* 2.06*   EGFRCR 20* 20* 24*   CA 8.7 8.4* 8.1*    139 141   K 4.6 4.7 4.2    108 109   CO2 18.0* 19.0* 23.0       Recent Labs   Lab  05/29/25  0825 05/30/25  0639 05/31/25  0859 06/01/25  0643   ALT <7*  --   --   --    AST 9  --   --   --    ALB 3.8 3.8 3.6 3.5         Imaging:  No results found.      Meds:     Scheduled Medications[1]  Medication Infusions[2]  PRN Medications[3]           [1]    meropenem  500 mg Intravenous q12h    pantoprazole  40 mg Intravenous Q24H    enoxaparin  30 mg Subcutaneous Daily    amLODIPine  5 mg Oral Daily    ferrous sulfate  325 mg Oral BID    gabapentin  300 mg Oral Nightly    insulin aspart  1-7 Units Subcutaneous TID CC   [2]    sodium bicarbonate in 0.45% NS infusion 75 mEq (06/01/25 0741)   [3]   glucose **OR** glucose **OR** glucose-vitamin C **OR** dextrose **OR** glucose **OR** glucose **OR** glucose-vitamin C    acetaminophen **OR** HYDROcodone-acetaminophen **OR** HYDROcodone-acetaminophen    morphINE **OR** morphINE **OR** morphINE    polyethylene glycol (PEG 3350)    sennosides    bisacodyl    fleet enema    ondansetron    metoclopramide

## 2025-06-01 NOTE — PLAN OF CARE
Patient is AxO4. VSS. Norco provided as needed for RLQ abdominal pain. Tolerating diet. Accuchecks AC/HS.Denies nausea/vomiting overnight. Voiding via urostomy to RLQ. Ambulating with standby/assist + RW. IVF and IV abx continued. Patient speaks Gujarati - declined use of language line overnight. Plan to discharge home when medically cleared. Appropriate safety measures maintained, call light within reach, and frequent rounding.         Problem: Patient Centered Care  Goal: Patient preferences are identified and integrated in the patient's plan of care  Description: Interventions:  - What would you like us to know as we care for you?   - Provide timely, complete, and accurate information to patient/family  - Incorporate patient and family knowledge, values, beliefs, and cultural backgrounds into the planning and delivery of care  - Encourage patient/family to participate in care and decision-making at the level they choose  - Honor patient and family perspectives and choices  Outcome: Progressing     Problem: Diabetes/Glucose Control  Goal: Glucose maintained within prescribed range  Description: INTERVENTIONS:  - Monitor Blood Glucose as ordered  - Assess for signs and symptoms of hyperglycemia and hypoglycemia  - Administer ordered medications to maintain glucose within target range  - Assess barriers to adequate nutritional intake and initiate nutrition consult as needed  - Instruct patient on self management of diabetes  Outcome: Progressing     Problem: Patient/Family Goals  Goal: Patient/Family Long Term Goal  Description: Patient's Long Term Goal:     Interventions:  -  - See additional Care Plan goals for specific interventions  Outcome: Progressing  Goal: Patient/Family Short Term Goal  Description: Patient's Short Term Goal:     Interventions:   -   - See additional Care Plan goals for specific interventions  Outcome: Progressing     Problem: RISK FOR INFECTION - ADULT  Goal: Absence of fever/infection  during anticipated neutropenic period  Description: INTERVENTIONS  - Monitor WBC  - Administer growth factors as ordered  - Implement neutropenic guidelines  Outcome: Progressing     Problem: SAFETY ADULT - FALL  Goal: Free from fall injury  Description: INTERVENTIONS:  - Assess pt frequently for physical needs  - Identify cognitive and physical deficits and behaviors that affect risk of falls.  - [place] fall precautions as indicated by assessment.  - Educate pt/family on patient safety including physical limitations  - Instruct pt to call for assistance with activity based on assessment  - Modify environment to reduce risk of injury  - Provide assistive devices as appropriate  - Consider OT/PT consult to assist with strengthening/mobility  - Encourage toileting schedule  Outcome: Progressing     Problem: DISCHARGE PLANNING  Goal: Discharge to home or other facility with appropriate resources  Description: INTERVENTIONS:  - Identify barriers to discharge w/pt and caregiver  - Include patient/family/discharge partner in discharge planning  - Arrange for needed discharge resources and transportation as appropriate  - Identify discharge learning needs (meds, wound care, etc)  - Arrange for interpreters to assist at discharge as needed  - Consider post-discharge preferences of patient/family/discharge partner  - Complete POLST form as appropriate  - Assess patient's ability to be responsible for managing their own health  - Refer to Case Management Department for coordinating discharge planning if the patient needs post-hospital services based on physician/LIP order or complex needs related to functional status, cognitive ability or social support system  Outcome: Progressing

## 2025-06-02 ENCOUNTER — APPOINTMENT (OUTPATIENT)
Dept: PICC SERVICES | Facility: HOSPITAL | Age: 75
End: 2025-06-02
Attending: PHYSICIAN ASSISTANT
Payer: MEDICARE

## 2025-06-02 VITALS
DIASTOLIC BLOOD PRESSURE: 66 MMHG | OXYGEN SATURATION: 95 % | BODY MASS INDEX: 21.49 KG/M2 | HEART RATE: 55 BPM | HEIGHT: 65 IN | RESPIRATION RATE: 18 BRPM | SYSTOLIC BLOOD PRESSURE: 139 MMHG | TEMPERATURE: 99 F | WEIGHT: 129 LBS

## 2025-06-02 PROBLEM — R10.31 RIGHT LOWER QUADRANT ABDOMINAL PAIN: Status: RESOLVED | Noted: 2025-05-29 | Resolved: 2025-06-02

## 2025-06-02 LAB
ANION GAP SERPL CALC-SCNC: 8 MMOL/L (ref 0–18)
BUN BLD-MCNC: 30 MG/DL (ref 9–23)
BUN/CREAT SERPL: 14.7 (ref 10–20)
BV BACTERIA DNA VAG QL NAA+PROBE: NEGATIVE
C ALBICANS IGE QN: 0.16 KUA/L (ref ?–0.1)
C GLABRATA DNA VAG QL NAA+PROBE: NEGATIVE
C KRUSEI DNA VAG QL NAA+PROBE: NEGATIVE
CALCIUM BLD-MCNC: 8.7 MG/DL (ref 8.7–10.4)
CANDIDA DNA VAG QL NAA+PROBE: POSITIVE
CHLORIDE SERPL-SCNC: 108 MMOL/L (ref 98–112)
CO2 SERPL-SCNC: 24 MMOL/L (ref 21–32)
CREAT BLD-MCNC: 2.04 MG/DL (ref 0.55–1.02)
EGFRCR SERPLBLD CKD-EPI 2021: 25 ML/MIN/1.73M2 (ref 60–?)
GLUCOSE BLD-MCNC: 136 MG/DL (ref 70–99)
GLUCOSE BLDC GLUCOMTR-MCNC: 149 MG/DL (ref 70–99)
GLUCOSE BLDC GLUCOMTR-MCNC: 256 MG/DL (ref 70–99)
GLUCOSE BLDC GLUCOMTR-MCNC: 326 MG/DL (ref 70–99)
MAGNESIUM SERPL-MCNC: 1.8 MG/DL (ref 1.6–2.6)
OSMOLALITY SERPL CALC.SUM OF ELEC: 298 MOSM/KG (ref 275–295)
POTASSIUM SERPL-SCNC: 4.4 MMOL/L (ref 3.5–5.1)
SODIUM SERPL-SCNC: 140 MMOL/L (ref 136–145)
T VAGINALIS DNA VAG QL NAA+PROBE: NEGATIVE

## 2025-06-02 PROCEDURE — 99233 SBSQ HOSP IP/OBS HIGH 50: CPT | Performed by: INTERNAL MEDICINE

## 2025-06-02 PROCEDURE — B54NZZZ ULTRASONOGRAPHY OF LEFT UPPER EXTREMITY VEINS: ICD-10-PCS | Performed by: STUDENT IN AN ORGANIZED HEALTH CARE EDUCATION/TRAINING PROGRAM

## 2025-06-02 PROCEDURE — 05HC33Z INSERTION OF INFUSION DEVICE INTO LEFT BASILIC VEIN, PERCUTANEOUS APPROACH: ICD-10-PCS | Performed by: STUDENT IN AN ORGANIZED HEALTH CARE EDUCATION/TRAINING PROGRAM

## 2025-06-02 RX ORDER — ERTAPENEM 1 G/1
0.5 INJECTION, POWDER, LYOPHILIZED, FOR SOLUTION INTRAMUSCULAR; INTRAVENOUS DAILY
Qty: 4 EACH | Refills: 0 | Status: SHIPPED | OUTPATIENT
Start: 2025-06-03 | End: 2025-06-11

## 2025-06-02 RX ORDER — MAGNESIUM OXIDE 400 MG/1
400 TABLET ORAL ONCE
Status: COMPLETED | OUTPATIENT
Start: 2025-06-02 | End: 2025-06-02

## 2025-06-02 NOTE — PLAN OF CARE
Patient is alert and oriented. On room air. Urostomy in place with output. Norco given prn for pain. Tolerating diet. Denies nausea and vomiting. Moves standby with a walker. ACHS accuchecks. Call light within reach. Safety measures in place.     Problem: Patient Centered Care  Goal: Patient preferences are identified and integrated in the patient's plan of care  Description: Interventions:  - What would you like us to know as we care for you? From home with family  - Provide timely, complete, and accurate information to patient/family  - Incorporate patient and family knowledge, values, beliefs, and cultural backgrounds into the planning and delivery of care  - Encourage patient/family to participate in care and decision-making at the level they choose  - Honor patient and family perspectives and choices  Outcome: Progressing     Problem: Diabetes/Glucose Control  Goal: Glucose maintained within prescribed range  Description: INTERVENTIONS:  - Monitor Blood Glucose as ordered  - Assess for signs and symptoms of hyperglycemia and hypoglycemia  - Administer ordered medications to maintain glucose within target range  - Assess barriers to adequate nutritional intake and initiate nutrition consult as needed  - Instruct patient on self management of diabetes  Outcome: Progressing     Problem: Patient/Family Goals  Goal: Patient/Family Long Term Goal  Description: Patient's Long Term Goal: To be discharged    Interventions:  - Medications, diagnostics  - See additional Care Plan goals for specific interventions  Outcome: Progressing  Goal: Patient/Family Short Term Goal  Description: Patient's Short Term Goal: To be able to go home    Interventions:   - Medications, diagnostics  - See additional Care Plan goals for specific interventions  Outcome: Progressing     Problem: RISK FOR INFECTION - ADULT  Goal: Absence of fever/infection during anticipated neutropenic period  Description: INTERVENTIONS  - Monitor WBC  -  Administer growth factors as ordered  - Implement neutropenic guidelines  Outcome: Progressing     Problem: SAFETY ADULT - FALL  Goal: Free from fall injury  Description: INTERVENTIONS:  - Assess pt frequently for physical needs  - Identify cognitive and physical deficits and behaviors that affect risk of falls.  - [place] fall precautions as indicated by assessment.  - Educate pt/family on patient safety including physical limitations  - Instruct pt to call for assistance with activity based on assessment  - Modify environment to reduce risk of injury  - Provide assistive devices as appropriate  - Consider OT/PT consult to assist with strengthening/mobility  - Encourage toileting schedule  Outcome: Progressing     Problem: DISCHARGE PLANNING  Goal: Discharge to home or other facility with appropriate resources  Description: INTERVENTIONS:  - Identify barriers to discharge w/pt and caregiver  - Include patient/family/discharge partner in discharge planning  - Arrange for needed discharge resources and transportation as appropriate  - Identify discharge learning needs (meds, wound care, etc)  - Arrange for interpreters to assist at discharge as needed  - Consider post-discharge preferences of patient/family/discharge partner  - Complete POLST form as appropriate  - Assess patient's ability to be responsible for managing their own health  - Refer to Case Management Department for coordinating discharge planning if the patient needs post-hospital services based on physician/LIP order or complex needs related to functional status, cognitive ability or social support system  Outcome: Progressing

## 2025-06-02 NOTE — PROGRESS NOTES
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 for management of possible appendicitis c/b ileal conduit urostomy.      RLQ pain, improving, likely 2/2 appendicitis c/b UTI and ileal conduit urostomy   History of radical cystectomy, hysterectomy, and bilateral salpingo-oophorectomy due to squamous cell bladder CA   CT: Inflammatory changes in the right lower quadrant with findings raising concern for acute appendicitis. No abscess. Recurrent bilateral hydroureteronephrosis with ileal loop diversion right lower quadrant noted.  General surgery and Urology with recommendation for conservative management and gradual advancement of diet   Patient has done well with clears, advancing to general today   Patient reports significant improvement in pain today, and no nausea 5/31     Plan:   Patient clear for discharge from surgical standpoint  Acetaminophen, Norco, morphine for pain. Bowel regimen in place.   Consults: General Surgery, Urology, ID, Nephrology   Abx as below      UTI  History of ESBL UTI and hydroureteronephrosis   Leukocytosis, improving  UA with positive nitrites and leuks, history of ESBL    Given Meropenem prior to transfer   CBC 5/28 without leukocytosis  Remains afebrile  ID recommending IV Invanz 500mg daily (CrCl 21ml/min) to complete a 2 week course   Nephrology okay with midline placement.  Placed and ordered prescription for the biotics  provided by infectious disease    Vaginal discharge  Patient's chronic vaginal discharge over the past 3 months with white cottage cheeselike appearance  Swab sent for concerns for Laila  Will empirically treat with fluconazole for now    JUAN MANUEL on CKD, likely 2/2 reflux from conduit and hydronephrosis  Anemia, chronic  Acidosis  Gradual improvement in Cr 6/2  On Iron PO therapy  Bicarb remains within normal limits 6/1-6/2      Plan:   Avoid nephrotoxic medications, titrate doses, when appropriate   Discontinue sodium bicarb drip  Appreciate nephrology recommendations, continue to monitor     Chronic problems:     HTN - Continue Amlodipine      HLD - Hold statin      NIDDM   - Hold PO Medications,   - Correctional Algorithm  - Gabapentin for neuropathy       FN:  - IVF: Bicarb drip  - Diet: General      DVT Prophy:Held  Lines:PIV     Dispo: DC after midline placement.    Questions/concerns were discussed with patient and/or family by bedside.    Total Time spent with patient and coordinating care:  >30 minutes    Thank You,  Jeffy Ibarra MD    Hospitalist with [facility]     Subjective:     Patient states she is feeling well today with no significant pain.  Discussed patient's plan with IV antibiotics extensively.  No family at bedside.  Will call to discuss today.  Patient okay with discharge.    OBJECTIVE:    Blood pressure 143/67, pulse 60, temperature 99.1 °F (37.3 °C), temperature source Oral, resp. rate 16, height 5' 5\" (1.651 m), weight 128 lb 15.5 oz (58.5 kg), SpO2 96%.    Temp:  [98 °F (36.7 °C)-99.1 °F (37.3 °C)] 99.1 °F (37.3 °C)  Pulse:  [60-74] 60  Resp:  [16-18] 16  BP: (121-143)/(67-81) 143/67  SpO2:  [96 %-98 %] 96 %      Intake/Output:    Intake/Output Summary (Last 24 hours) at 6/2/2025 1050  Last data filed at 6/2/2025 0637  Gross per 24 hour   Intake 1630 ml   Output 1200 ml   Net 430 ml       Last 3 Weights   05/28/25 2037 128 lb 15.5 oz (58.5 kg)   07/08/24 0626 123 lb (55.8 kg)    06/25/24 1207 130 lb (59 kg)   07/05/24 1304 120 lb (54.4 kg)       Exam   Physical Exam  Vitals reviewed.   Constitutional:       General: She is not in acute distress.     Appearance: Normal appearance. She is not ill-appearing, toxic-appearing or diaphoretic.   Cardiovascular:      Rate and Rhythm: Normal rate and regular rhythm.      Pulses: Normal pulses.      Heart sounds: Normal heart sounds.   Pulmonary:      Effort: Pulmonary effort is normal.      Breath sounds: Normal breath sounds.   Abdominal:      Palpations: Abdomen is soft.      Tenderness: There is no abdominal tenderness.   Skin:     General: Skin is warm and dry.      Capillary Refill: Capillary refill takes less than 2 seconds.   Neurological:      General: No focal deficit present.      Mental Status: She is alert and oriented to person, place, and time. Mental status is at baseline.   Psychiatric:         Mood and Affect: Mood normal.         Behavior: Behavior normal.             Data Review:       Labs:     Recent Labs   Lab 05/29/25 0825 05/30/25  0639 05/31/25  0859   RBC 3.04* 3.00* 2.97*   HGB 9.6* 9.4* 9.4*   HCT 29.6* 29.1* 28.9*   MCV 97.4 97.0 97.3   MCH 31.6 31.3 31.6   MCHC 32.4 32.3 32.5   RDW 12.7 12.8 12.5   NEPRELIM 6.24 4.63 4.50   WBC 7.8 6.2 6.0   .0 188.0 197.0         Recent Labs   Lab 05/31/25  0859 06/01/25  0643 06/02/25  0632   GLU 95 133* 136*   BUN 35* 33* 30*   CREATSERUM 2.39* 2.06* 2.04*   EGFRCR 20* 24* 25*   CA 8.4* 8.1* 8.7    141 140   K 4.7 4.2 4.4    109 108   CO2 19.0* 23.0 24.0       Recent Labs   Lab 05/29/25  0825 05/30/25  0639 05/31/25  0859 06/01/25  0643   ALT <7*  --   --   --    AST 9  --   --   --    ALB 3.8 3.8 3.6 3.5         Imaging:  No results found.      Meds:     Scheduled Medications[1]  Medication Infusions[2]  PRN Medications[3]           [1]    magnesium oxide  400 mg Oral Once    insulin aspart  2 Units Subcutaneous TID CC    meropenem  500 mg Intravenous q12h     pantoprazole  40 mg Intravenous Q24H    enoxaparin  30 mg Subcutaneous Daily    amLODIPine  5 mg Oral Daily    ferrous sulfate  325 mg Oral BID    gabapentin  300 mg Oral Nightly    insulin aspart  1-7 Units Subcutaneous TID CC   [2] [3]   glucose **OR** glucose **OR** glucose-vitamin C **OR** dextrose **OR** glucose **OR** glucose **OR** glucose-vitamin C    acetaminophen **OR** HYDROcodone-acetaminophen **OR** HYDROcodone-acetaminophen    morphINE **OR** morphINE **OR** morphINE    polyethylene glycol (PEG 3350)    sennosides    bisacodyl    fleet enema    ondansetron    metoclopramide

## 2025-06-02 NOTE — DISCHARGE INSTRUCTIONS
Sometimes managing your health at home requires assistance.  The [facility] team has recognized your preference to use [facility].  They can be reached by phone at [phone].  The fax number for your reference is [phone].  A representative from the home health agency will contact you or your family to schedule your first visit.       The following infusion provider will be providing your IV antibiotics and supplies.  Please call as soon as you arrive home.    [facility] Accredited  [address] [place] [id number]  Phone: [phone]  Fax: [phone]

## 2025-06-02 NOTE — HOME CARE LIAISON
[facility]. Spoke w/ patient's daughter Irina who is agreeable with [facility]. Contact information placed on AVS.

## 2025-06-02 NOTE — CM/SW NOTE
MDO placed for discharge.    CM met with patient at bedside to discuss IV ABX regimen + infusion options.  Patient declined  services, requested her daughter Imani provide translation via speaker phone.    Patient's daughter Imani confirms patient/family preference is home infusion with [facility] and [facility].  Imani will be present for teach and train and confirms she will be comfortable administering IV ABX regimen for her mother following teach and train.    CM sent home healthcare referral via [facility] to [facility] via [facility] and confirmed with katty Lovett - [facility] staff RN tomorrow for teach and train. CM reserved [facility] via [facility]. Patient discharge instructions updated with [facility] contact information.     CM sent infusion referral via [facility] and confirmed with katty [place] [facility] will deliver IV ABX and supplies to patient's home this evening.      CM uploaded all required documentation to  and infusion referrals via [facility].    CM confirmed with ANASTACIA Fam - first dose Ertapenem will be administered prior to discharge.    Patient cleared for discharge from CM/SW standpoint.    Plan: Home with [facility] and [facility] for IV ABX regimen    Alicia Underwood RN, BSN  Nurse   [phone]

## 2025-06-02 NOTE — PROGRESS NOTES
[facility]  part of [facility] Infectious Disease Progress Note    Mainkurt Dee Deeaudrey Fred Patient Status:  Inpatient    1950 MRN [id number]   Location [facility] 4W/SW/SE Attending Jeffy Ibarra MD   Hosp Day # 5 PCP SOURAV FARR     Subjective:  Chart reviewed, no acute events.  Pt seen bedside. She denies abdominal pain or nausea to me.   No fevers.      Objective:    Allergies:  Allergies[1]    Medications:  Current Hospital Medications[2]    Physical Exam:  General: Alert, orientated x3.  Cooperative.  No apparent distress.  Vital Signs:  Blood pressure 122/76, pulse 60, temperature 99.1 °F (37.3 °C), temperature source Oral, resp. rate 16, height 5' 5\" (1.651 m), weight 128 lb 15.5 oz (58.5 kg), SpO2 96%.   Temp (24hrs), Av.4 °F (36.9 °C), Min:98 °F (36.7 °C), Max:99.1 °F (37.3 °C)      HEENT: Exam is unremarkable.  Without scleral icterus.  Mucous membranes are moist. PERRLA.  Oropharynx is clear.  Lungs: Clear to auscultation bilaterally.  Cardiac: Regular rate   Abdomen:  Soft, non-distended, non-tender, with no rebound or guarding.  Urine light yellow, no sediment in bag  Extremities:  No lower extremity edema noted.  Without clubbing or cyanosis.    Skin: Normal texture and turgor.  Neurologic: Cranial nerves are grossly intact.      Labs:  Lab Results   Component Value Date    MG 1.8 2025       Radiology:      Assessment/Plan:    1.  ESBL E Coli UTI  -cultures done on 25, with ESBL E Coli.  given IV Meropenem prior to admission here  -hx of bladder cancer s/p urostomy  -CT with b/l hydronephrosis, urology following  -on IV Meropenem day #5    2. Possible Appendicitis  -surgery following, conservative management  -on IV Meropenem    3. CKD  -nephrology following    DISPO:  Continue IV Meropenem while here, and plan for IV Invanz 500mg daily (CrCl 21ml/min) to complete a 2 week course.  Message sent out to nephrology regarding  access given her CKD, if cleared will get midline placed today.  Rx left in chart.  D/w Dr Lu.     If you have any questions or concerns please call [facility] at [phone].     NELL Keys  6/2/2025  8:41 AM         [1] No Known Allergies  [2]   Current Facility-Administered Medications:     insulin aspart (NovoLOG) 100 Units/mL FlexPen 2 Units, 2 Units, Subcutaneous, TID CC    meropenem (Merrem) 500 mg in sodium chloride 0.9% 100mL IVPB-NAJMA, 500 mg, Intravenous, q12h    pantoprazole (Protonix) 40 mg in sodium chloride 0.9% PF 10 mL IV push, 40 mg, Intravenous, Q24H    enoxaparin (Lovenox) 30 MG/0.3ML SUBQ injection 30 mg, 30 mg, Subcutaneous, Daily    amLODIPine (Norvasc) tab 5 mg, 5 mg, Oral, Daily    ferrous sulfate DR tab 325 mg, 325 mg, Oral, BID    gabapentin (Neurontin) cap 300 mg, 300 mg, Oral, Nightly    glucose (Dex4) 15 GM/59ML oral liquid 15 g, 15 g, Oral, Q15 Min PRN **OR** glucose (Glutose) 40% oral gel 15 g, 15 g, Oral, Q15 Min PRN **OR** glucose-vitamin C (Dex-4) chewable tab 4 tablet, 4 tablet, Oral, Q15 Min PRN **OR** dextrose 50% injection 50 mL, 50 mL, Intravenous, Q15 Min PRN **OR** glucose (Dex4) 15 GM/59ML oral liquid 30 g, 30 g, Oral, Q15 Min PRN **OR** glucose (Glutose) 40% oral gel 30 g, 30 g, Oral, Q15 Min PRN **OR** glucose-vitamin C (Dex-4) chewable tab 8 tablet, 8 tablet, Oral, Q15 Min PRN    insulin aspart (NovoLOG) 100 Units/mL FlexPen 1-7 Units, 1-7 Units, Subcutaneous, TID CC    acetaminophen (Tylenol) tab 650 mg, 650 mg, Oral, Q4H PRN **OR** HYDROcodone-acetaminophen (Norco) 5-325 MG per tab 1 tablet, 1 tablet, Oral, Q4H PRN **OR** HYDROcodone-acetaminophen (Norco) 5-325 MG per tab 2 tablet, 2 tablet, Oral, Q4H PRN    morphINE PF 2 MG/ML injection 1 mg, 1 mg, Intravenous, Q2H PRN **OR** morphINE PF 2 MG/ML injection 2 mg, 2 mg, Intravenous, Q2H PRN **OR** morphINE PF 4 MG/ML injection 4 mg, 4 mg, Intravenous, Q2H PRN    polyethylene glycol (PEG 3350) (Miralax) 17 g oral  packet 17 g, 17 g, Oral, Daily PRN    sennosides (Senokot) tab 17.2 mg, 17.2 mg, Oral, Nightly PRN    bisacodyl (Dulcolax) 10 MG rectal suppository 10 mg, 10 mg, Rectal, Daily PRN    fleet enema (Fleet) rectal enema 133 mL, 1 enema, Rectal, Once PRN    ondansetron (Zofran) 4 MG/2ML injection 4 mg, 4 mg, Intravenous, Q6H PRN    metoclopramide (Reglan) 5 mg/mL injection 10 mg, 10 mg, Intravenous, Q8H PRN

## 2025-06-02 NOTE — PROGRESS NOTES
[facility]  part of [facility]    Progress Note    Michael Ibarra Patient Status:  Inpatient    1950 MRN [id number]   Location [facility] 4W/SW/SE Attending Jeffy Ibarra MD   Hosp Day # 5 PCP SOURAV FARR       Subjective:   Michael Ibarra is a(n) 75 year old female who I am seeing for CKD    No new issues  No shortness of breath  No vomiting overnight  Clear urine in the bag    Objective:   /67 (BP Location: Right arm)   Pulse 60   Temp 99.1 °F (37.3 °C) (Oral)   Resp 16   Ht 5' 5\" (1.651 m)   Wt 128 lb 15.5 oz (58.5 kg)   SpO2 96%   BMI 21.46 kg/m²      Intake/Output Summary (Last 24 hours) at 2025 1105  Last data filed at 2025 0637  Gross per 24 hour   Intake 1630 ml   Output 1200 ml   Net 430 ml     Wt Readings from Last 1 Encounters:   25 128 lb 15.5 oz (58.5 kg)       Exam  Vital signs: Blood pressure 143/67, pulse 60, temperature 99.1 °F (37.3 °C), temperature source Oral, resp. rate 16, height 5' 5\" (1.651 m), weight 128 lb 15.5 oz (58.5 kg), SpO2 96%.    General: No acute distress. Alert and oriented x 3.  HEENT: Moist mucous membranes. EOMI. PERRLA  Neck:  No JVD.   Respiratory: Clear to auscultation bilaterally.    Cardiovascular: S1, S2.  Regular rate and rhythm.   Abdomen: Soft, nontender, nondistended.    Neurologic: No focal neurological deficits.  Musculoskeletal: Full range of motion of all extremities.  No swelling noted.  Access:    Results:     Recent Labs   Lab 25  0825 25  0639 25  0859   RBC 3.04* 3.00* 2.97*   HGB 9.6* 9.4* 9.4*   HCT 29.6* 29.1* 28.9*   MCV 97.4 97.0 97.3   MCH 31.6 31.3 31.6   MCHC 32.4 32.3 32.5   RDW 12.7 12.8 12.5   NEPRELIM 6.24 4.63 4.50   WBC 7.8 6.2 6.0   .0 188.0 197.0         Recent Labs   Lab 25  0859 25  0643 25  0632   GLU 95 133* 136*   BUN 35* 33* 30*   CREATSERUM 2.39* 2.06* 2.04*   CA 8.4* 8.1* 8.7    141  140   K 4.7 4.2 4.4    109 108   CO2 19.0* 23.0 24.0          No results found.    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 Ct  suggesting appendicitis but bc of ileal conduit urostomy was transferred to [place] bc of her surgeon  Also found to have UTI and getting treated for it   CT abd at outside hospital  Recurrent bilateral hydroureteronephrosis with ileal loop diversion right lower quadrant noted.     Impression:    JUAN MANUEL, mild, also with recurrent b/l hydro: Recurrence of bilateral hydronephrosis.  Urology on board  CKD IV- b/l cr 2.3-2.5 mg/dl . Thought sec to urinary reflux from ileal conduit. Follows with Dr Behara ([facility])   Acidosis sec to ileal conduit , on sodium bicarb  HTN- home meds  DM 2 ISS  H/o squamous cell ca of bladder s/p cystectomy and ileal conduit July 2024   H/o b/l hydro and requiring b/l PCN and eventual removal   UTI , abx  Right lower quad pain, ct wih appendicitis. Surg eval conservative management   Sec hpth, phosp ok      Plan:    Kidney function improved and stable at 2.05 for last 48 hours  Serum bicarb within normal limits today.  Off of bicarb drip  IV antibiotic both for UTI and appendicitis-on meropenem with plan to transition to Invanz.  Okay to proceed with midline  CT scan at [facility]- ( care everywhere) Recurrent bilateral hydroureteronephrosis with ileal loop diversion right lower quadrant noted. Defer to urology whether plan for pcn . Prev had it and was removed. Regardless of b/l PCN in jan cr was 2.2-2.5 mg/dl range.    Discussed with nursing.  Surgical input noted.  ID input noted      Evan Resendiz MD

## 2025-06-02 NOTE — DISCHARGE SUMMARY
Hospitalist Discharge Summary    Admission Date/Time  5/28/2025  8:34 PM  Discharge Date  06/02/25    PCP  CELIA JIMENEZ     Discharging Hospitalist:  Jeffy Ibarra MD    Disposition:  Home    Follow Up Appointments  Celia Jimenez  [address]  [facility] [address]  [phone]    Schedule an appointment as soon as possible for a visit in 1 week(s)  Diabetes / Hospital follow up    Behara, Venkat Y  [address]  [address]  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      RLQ pain, resolved, likely 2/2 appendicitis c/b UTI and ileal conduit urostomy   History of radical cystectomy, hysterectomy, and bilateral salpingo-oophorectomy due to squamous cell bladder CA   CT: Inflammatory changes in the right lower quadrant with findings raising concern for acute appendicitis. No abscess. Recurrent bilateral hydroureteronephrosis with ileal loop diversion right lower quadrant noted.  General surgery and Urology with recommendation for conservative management and do not believe surgical intervention is needed.  Patient with significant improvement with antibiotic course.     Plan:   Patient clear for discharge from surgical standpoint  Abx as below      UTI  History of ESBL UTI and hydroureteronephrosis   Leukocytosis, improving  UA with positive nitrites and leuks, history of ESBL    Given Meropenem prior to transfer   CBC 5/28 without leukocytosis  Remains  afebrile  ID recommending IV ertapenem 500mg daily (CrCl 21ml/min) to complete a 2 week course      Vaginal discharge likely to Candida  Patient's chronic vaginal discharge over the past 3 months with white cottage cheeselike appearance  Swab positive for Candida  Treated with fluconazole.  Will need follow-up with PCP for resolution of symptoms     JUAN MANUEL on CKD, likely 2/2 reflux from conduit and hydronephrosis  Anemia, chronic  Acidosis, resolved  Gradual improvement in Cr 6/2  On Iron PO therapy  Bicarb remains within normal limits 6/1-6/2   Serum bicarb drip discontinued     Chronic problems:     HTN - Continue Amlodipine      HLD - Hold statin      NIDDM   - Continue home medications on discharge    Medication Changes  IV ertapenem 2 weeks via midline    Important Follow Up Items  Labs: None  Imaging: None  Medications: Completion of IV or ertapenem, redosing fluconazole if no resolution of vulvovaginal candidiasis  Incidental findings: None    Procedures/Diagnostics  None    Change in Code Status: None, full code    Discharge Medications       Medication List        START taking these medications      ertapenem 1 g Solr  Commonly known as: Invanz  Inject 0.5 g into the vein daily for 8 days.  Start taking on: Cristiana 3, 2025            CONTINUE taking these medications      amLODIPine 5 MG Tabs  Commonly known as: Norvasc     docusate sodium 100 MG Caps  Commonly known as: COLACE  Take 100 mg by mouth 2 (two) times daily.     Ferrous Sulfate 324 (65 Fe) MG Tbec     gabapentin 300 MG Caps  Commonly known as: Neurontin     glipiZIDE 10 MG Tabs  Commonly known as: Glucotrol     HYDROcodone-acetaminophen 5-325 MG Tabs  Commonly known as: Norco     Januvia 100 MG Tabs  Generic drug: SITagliptin Phosphate     metFORMIN 850 MG Tabs  Commonly known as: Glucophage     * REFRESH DRY EYE THERAPY OP     * REFRESH DRY EYE THERAPY OP     traMADol 50 MG Tabs  Commonly known as: Ultram  Take 1 tablet (50 mg total) by mouth every 6  (six) hours as needed.           * This list has 2 medication(s) that are the same as other medications prescribed for you. Read the directions carefully, and ask your doctor or other care provider to review them with you.                   Where to Get Your Medications        You can get these medications from any pharmacy    Bring a paper prescription for each of these medications  ertapenem 1 g Solr       I reconciled current and discharge medications on the day of discharge.    Imaging/Diagnostic Reports  No results found.    Secondary Discharge Diagnoses  Vulvovaginal candidiasis    Pertinent Physical Exam At Time of Discharge  Vitals:    06/01/25 2024 06/02/25 0341 06/02/25 0939 06/02/25 1231   BP: 121/71 122/76 143/67 139/66   BP Location: Right arm Right arm Right arm Right arm   Pulse: 65 60  55   Resp: 18 16  18   Temp: 98.5 °F (36.9 °C) 99.1 °F (37.3 °C)  98.9 °F (37.2 °C)   TempSrc: Oral Oral  Oral   SpO2: 98% 96%  95%   Weight:       Height:            General: no acute distress  Heart: RRR  Lungs: CTAB  Abd: Soft, NT, ND  Neuro: no focal deficits    Total time coordinating care > 30 mins.    Patient had opportunity to ask questions and stated understanding and agreed with therapeutic plan as outlined.     Jeffy Ibarra MD  6/2/2025

## 2025-06-03 NOTE — PLAN OF CARE
Patient discharged home with her daughter and family members. Midline placed prior to discharge and working well. IV Invanz dosage given prior to discharge. Message left with residential home health to notify daughter in regards to follow up for tomorrow. Patient tolerating diet and denies abdominal pain. Urostomy emptied by patient.

## (undated) DEVICE — 450 ML BOTTLE OF 0.05% CHLORHEXIDINE GLUCONATE IN 99.95% STERILE WATER FOR IRRIGATION, USP AND APPLICATOR.: Brand: IRRISEPT ANTIMICROBIAL WOUND LAVAGE

## (undated) DEVICE — HANDLE SUR BLU PLAS LT FLX SLIP ON ST DISP

## (undated) DEVICE — BLADELESS OBTURATOR: Brand: WECK VISTA

## (undated) DEVICE — BIPOLAR GRASPER, LONG: Brand: ENDOWRIST

## (undated) DEVICE — SUT PERMA- 2-0 30IN SH NABSRB BLK L26MM 1/

## (undated) DEVICE — TROCAR: Brand: KII FIOS FIRST ENTRY

## (undated) DEVICE — CADIERE FORCEPS: Brand: ENDOWRIST

## (undated) DEVICE — COLUMN DRAPE

## (undated) DEVICE — GAMMEX® PI HYBRID SIZE 7, STERILE POWDER-FREE SURGICAL GLOVE, POLYISOPRENE AND NEOPRENE BLEND: Brand: GAMMEX

## (undated) DEVICE — EVACUATOR SUR 100CC SIL BLB WND

## (undated) DEVICE — LARGE NEEDLE DRIVER: Brand: ENDOWRIST

## (undated) DEVICE — SUT MCRYL 4-0 18IN PS-2 ABSRB UD 19MM 3/8 CIR

## (undated) DEVICE — ANCHOR TISSUE RETRIEVAL SYSTEM, BAG SIZE 175 ML, PORT SIZE 10 MM: Brand: ANCHOR TISSUE RETRIEVAL SYSTEM

## (undated) DEVICE — MONOPOLAR CURVED SCISSORS: Brand: ENDOWRIST

## (undated) DEVICE — Device

## (undated) DEVICE — SEAL

## (undated) DEVICE — ELECTRO LUBE IS A SINGLE PATIENT USE DEVICE THAT IS INTENDED TO BE USED ON ELECTROSURGICAL ELECTRODES TO REDUCE STICKING.: Brand: KEY SURGICAL ELECTRO LUBE

## (undated) DEVICE — SUT POLYSRB 0 60IN ABSRB UD POLY BRD

## (undated) DEVICE — ABSORBABLE WOUND CLOSURE DEVICE: Brand: V-LOC 90

## (undated) DEVICE — ANTIBACTERIAL UNDYED BRAIDED (POLYGLACTIN 910), SYNTHETIC ABSORBABLE SUTURE: Brand: COATED VICRYL

## (undated) DEVICE — SUT COAT VCRL+ 0 27IN UR-6 ABSRB VLT ANTIBACT

## (undated) DEVICE — SPONGE,LAP,4"X18",XR,ST,5/PK,40PK/CS: Brand: MEDLINE INDUSTRIES, INC.

## (undated) DEVICE — SOLUTION IRRIG 1000ML ST H2O AQUALITE PLAS

## (undated) DEVICE — GLOVE SUR 7.5 SENSICARE PI MIC PIP CRM PWD F

## (undated) DEVICE — SUT PDS II 0 27IN CT-1 ABSRB VLT L36MM 1/2

## (undated) DEVICE — SOLUTION IV 1000ML DIL ST H2O

## (undated) DEVICE — BANDAGE,GAUZE,BULKEE II,2.25"X3YD,STRL: Brand: MEDLINE

## (undated) DEVICE — BAG DRNGE 2000ML URIN INF CTRL ANTIREFLX

## (undated) DEVICE — ABSORBABLE HEMOSTAT (OXIDIZED REGENERATED CELLULOSE, U.S.P.): Brand: SURGICEL FIBRILLAR

## (undated) DEVICE — SUT COAT VCRL 4-0 27IN RB-1 ABSRB UD 17MM 1/2

## (undated) DEVICE — ROBOTIC: Brand: MEDLINE INDUSTRIES, INC.

## (undated) DEVICE — SUT ETHLN 2-0 18IN FS NABSRB BLK 26MM 3/8 CIR

## (undated) DEVICE — GLOVE SUR 6.5 DERMASSURE PCP DK GRN PWD F

## (undated) DEVICE — BLAKE SILICONE DRAIN, 15 FR ROUND, HUBLESS: Brand: BLAKE

## (undated) DEVICE — ZZDISC - USE 405166-SUT COAT VCRL 3-0 27IN SH ABSRB UD 26MM 1/2

## (undated) DEVICE — SUT PERMA- 0 30IN NABSRB BLK TIE SILK

## (undated) DEVICE — REDUCER: Brand: ENDOWRIST

## (undated) DEVICE — 60 ML SYRINGE LUER-LOCK TIP: Brand: MONOJECT

## (undated) DEVICE — SUT PERMA- 3-0 30IN SH NABSRB BLK 26MM 1/2

## (undated) DEVICE — PROGRASP FORCEPS: Brand: ENDOWRIST

## (undated) DEVICE — DRAPE,ABDOMINAL,MAJOR,STERILE: Brand: MEDLINE

## (undated) DEVICE — ARM DRAPE

## (undated) DEVICE — TISSUE RETRIEVAL SYSTEM: Brand: INZII RETRIEVAL SYSTEM

## (undated) DEVICE — ENFIT PU FEEDING TUBE PURP 6.5FR 60CM: Brand: ENFIT PU FEEDING TUBE

## (undated) DEVICE — COVER,MAYO STAND,STERILE: Brand: MEDLINE

## (undated) DEVICE — CLIP INT L POLYMER LOK LIG HEM O LOK

## (undated) DEVICE — SUT PROL 4-0 36IN RB-1 NABSRB BLU 17MM 1/2 CI

## (undated) DEVICE — STAPLER 60: Brand: SUREFORM

## (undated) DEVICE — GLOVE SUR 6.5 SENSICARE PI PIP CRM PWD F

## (undated) DEVICE — TIP COVER ACCESSORY

## (undated) DEVICE — SOLUTION IRRIG 3000ML 0.9% NACL FLX CONT

## (undated) DEVICE — TUBING MEGADYNE LAPAROSCOPIC

## (undated) DEVICE — PAD POS 36IN DISP SURGYPAD

## (undated) DEVICE — SOLUTION IRRIG 1000ML 0.9% NACL USP BTL

## (undated) DEVICE — ADHESIVE SKIN TOP FOR WND CLSR DERMBND ADV

## (undated) DEVICE — SYRINGE IRRIG 60ML TOOM TIP BOLD 2 GRAD RIG

## (undated) DEVICE — CANNULA SEAL

## (undated) DEVICE — STAPLER 60 RELOAD BLUE: Brand: SUREFORM